# Patient Record
Sex: FEMALE | Race: WHITE | Employment: OTHER | URBAN - METROPOLITAN AREA
[De-identification: names, ages, dates, MRNs, and addresses within clinical notes are randomized per-mention and may not be internally consistent; named-entity substitution may affect disease eponyms.]

---

## 2017-02-23 ENCOUNTER — HOSPITAL ENCOUNTER (OUTPATIENT)
Dept: CT IMAGING | Age: 67
Discharge: HOME | End: 2017-02-23
Attending: UROLOGY
Payer: MEDICARE

## 2017-02-23 DIAGNOSIS — R31.29 MICROSCOPIC HEMATURIA: ICD-10-CM

## 2017-02-23 PROCEDURE — 74011636320 HC RX REV CODE- 636/320: Performed by: UROLOGY

## 2017-02-23 PROCEDURE — 74178 CT ABD&PLV WO CNTR FLWD CNTR: CPT

## 2017-02-23 PROCEDURE — 74011000258 HC RX REV CODE- 258: Performed by: UROLOGY

## 2017-02-23 RX ORDER — SODIUM CHLORIDE 0.9 % (FLUSH) 0.9 %
10 SYRINGE (ML) INJECTION
Status: COMPLETED | OUTPATIENT
Start: 2017-02-23 | End: 2017-02-23

## 2017-02-23 RX ADMIN — Medication 10 ML: at 14:58

## 2017-02-23 RX ADMIN — SODIUM CHLORIDE 100 ML: 900 INJECTION, SOLUTION INTRAVENOUS at 14:58

## 2017-02-23 RX ADMIN — IOVERSOL 100 ML: 741 INJECTION INTRA-ARTERIAL; INTRAVENOUS at 14:58

## 2017-08-23 ENCOUNTER — HOSPITAL ENCOUNTER (OUTPATIENT)
Dept: MAMMOGRAPHY | Age: 67
Discharge: HOME | End: 2017-08-23
Attending: INTERNAL MEDICINE
Payer: MEDICARE

## 2017-08-23 DIAGNOSIS — Z12.31 VISIT FOR SCREENING MAMMOGRAM: ICD-10-CM

## 2017-08-23 PROCEDURE — 77067 SCR MAMMO BI INCL CAD: CPT

## 2017-12-05 ENCOUNTER — HOSPITAL ENCOUNTER (OUTPATIENT)
Dept: GENERAL RADIOLOGY | Age: 67
Discharge: HOME | End: 2017-12-05
Attending: INTERNAL MEDICINE
Payer: MEDICARE

## 2017-12-05 DIAGNOSIS — Z00.00 ROUTINE CHECK-UP: ICD-10-CM

## 2017-12-05 PROCEDURE — 71020 XR CHEST PA LAT: CPT

## 2018-02-15 ENCOUNTER — HOSPITAL ENCOUNTER (EMERGENCY)
Age: 68
Discharge: HOME | End: 2018-02-15
Attending: EMERGENCY MEDICINE
Payer: MEDICARE

## 2018-02-15 ENCOUNTER — APPOINTMENT (OUTPATIENT)
Dept: GENERAL RADIOLOGY | Age: 68
End: 2018-02-15
Attending: EMERGENCY MEDICINE
Payer: MEDICARE

## 2018-02-15 VITALS
BODY MASS INDEX: 29.99 KG/M2 | OXYGEN SATURATION: 98 % | HEART RATE: 74 BPM | WEIGHT: 180 LBS | DIASTOLIC BLOOD PRESSURE: 71 MMHG | RESPIRATION RATE: 18 BRPM | TEMPERATURE: 98 F | HEIGHT: 65 IN | SYSTOLIC BLOOD PRESSURE: 138 MMHG

## 2018-02-15 DIAGNOSIS — S62.617A CLOSED DISPLACED FRACTURE OF PROXIMAL PHALANX OF LEFT LITTLE FINGER, INITIAL ENCOUNTER: Primary | ICD-10-CM

## 2018-02-15 DIAGNOSIS — S66.902A: ICD-10-CM

## 2018-02-15 DIAGNOSIS — T14.8XXA ABRASION: ICD-10-CM

## 2018-02-15 PROCEDURE — 77030008305 HC SPLNT FNGR ALUM DJOR -A

## 2018-02-15 PROCEDURE — 73130 X-RAY EXAM OF HAND: CPT

## 2018-02-15 PROCEDURE — 75810000053 HC SPLINT APPLICATION: Performed by: PHYSICIAN ASSISTANT

## 2018-02-15 PROCEDURE — 99283 EMERGENCY DEPT VISIT LOW MDM: CPT | Performed by: PHYSICIAN ASSISTANT

## 2018-02-15 RX ORDER — TRAMADOL HYDROCHLORIDE 50 MG/1
50 TABLET ORAL
Qty: 15 TAB | Refills: 0 | Status: SHIPPED | OUTPATIENT
Start: 2018-02-15

## 2018-02-15 NOTE — ED PROVIDER NOTES
Patient is a 75 y.o. female presenting with finger pain. The history is provided by the patient. Finger Pain    This is a new problem. The current episode started less than 1 hour ago. The problem occurs constantly. The problem has not changed since onset. Pain location: left 5th finger  The quality of the pain is described as aching. The pain is at a severity of 5/10. The pain is mild. Associated symptoms include limited range of motion and stiffness. The symptoms are aggravated by activity and palpation. She has tried nothing for the symptoms. The treatment provided no relief. There has been a history of trauma. Past Medical History:   Diagnosis Date    Chronic kidney disease     stones    Diabetes ([address]     Hypertension     Kidney stone        Past Surgical History:   Procedure Laterality Date    HX BREAST BIOPSY      Rt Breast    HX BREAST REDUCTION      2008    HX GYN      hysterectomy    HX HYSTERECTOMY      HX OTHER SURGICAL      liithotripsy, parathyroid    HX TONSILLECTOMY           Family History:   Problem Relation Age of Onset    Hypertension Brother     Breast Cancer Neg Hx     Kidney Disease Neg Hx        Social History     Social History    Marital status:      Spouse name: N/A    Number of children: N/A    Years of education: N/A     Occupational History    Not on file. Social History Main Topics    Smoking status: Never Smoker    Smokeless tobacco: Never Used    Alcohol use No    Drug use: Not on file    Sexual activity: Not on file     Other Topics Concern    Not on file     Social History Narrative         ALLERGIES: Cephalexin; Morphine; and Pcn [penicillins]    Review of Systems   Musculoskeletal: Positive for stiffness. All other systems reviewed and are negative.       Vitals:    02/15/18 1415   BP: 142/74   Pulse: 77   Resp: 18   Temp: 98 °F (36.7 °C)   SpO2: 94%   Weight: 81.6 kg (180 lb)   Height: 5' 5\" (1.651 m)            Physical Exam Constitutional: She is oriented to person, place, and time. She appears well-developed and well-nourished. No distress. HENT:   Head: Normocephalic and atraumatic. Eyes: Conjunctivae and EOM are normal. Pupils are equal, round, and reactive to light. Neck: Normal range of motion. Neck supple. Cardiovascular: Normal rate and regular rhythm. Pulmonary/Chest: Effort normal and breath sounds normal. No respiratory distress. She has no wheezes. Abdominal: Soft. Bowel sounds are normal.   Musculoskeletal: She exhibits edema and tenderness. Left 5th finger with swelling and bruising over pip joint area, can flex cannot extend, no abrasion, sensation intact, rt knee with slight abrasion, full rom    Neurological: She is alert and oriented to person, place, and time. Skin: Skin is warm. Nursing note and vitals reviewed.        MDM  Number of Diagnoses or Management Options  Diagnosis management comments: Left 5th finger with chip fx to pip joint, probable extensor tendon injury, placed in extension splint  To follow up with Ladan   Pt up to date on tetanus , to keep knee abrasion clean        Amount and/or Complexity of Data Reviewed  Tests in the radiology section of CPT®: ordered and reviewed  Review and summarize past medical records: yes    Risk of Complications, Morbidity, and/or Mortality  Presenting problems: moderate  Diagnostic procedures: moderate  Management options: low    Patient Progress  Patient progress: improved        ED Course       Procedures

## 2018-02-15 NOTE — ED NOTES
I have reviewed discharge instructions with the patient. The patient verbalized understanding. Patient left ED via Discharge Method: ambulatory to Home with (spouse). Opportunity for questions and clarification provided. Patient given 1 scripts. To continue your aftercare when you leave the hospital, you may receive an automated call from our care team to check in on how you are doing. This is a free service and part of our promise to provide the best care and service to meet your aftercare needs.  If you have questions, or wish to unsubscribe from this service please call [phone]. Thank you for Choosing our [facility].

## 2018-02-15 NOTE — DISCHARGE INSTRUCTIONS
Scrapes (Abrasions): Care Instructions  Your Care Instructions  Scrapes (abrasions) are wounds where your skin has been rubbed or torn off. Most scrapes do not go deep into the skin, but some may remove several layers of skin. Scrapes usually don't bleed much, but they may ooze pinkish fluid. Scrapes on the head or face may appear worse than they are. They may bleed a lot because of the good blood supply to this area. Most scrapes heal well and may not need a bandage. They usually heal within 3 to 7 days. A large, deep scrape may take 1 to 2 weeks or longer to heal. A scab may form on some scrapes. Follow-up care is a key part of your treatment and safety. Be sure to make and go to all appointments, and call your doctor if you are having problems. It's also a good idea to know your test results and keep a list of the medicines you take. How can you care for yourself at home? · If your doctor told you how to care for your wound, follow your doctor's instructions. If you did not get instructions, follow this general advice:  ¨ Wash the scrape with clean water 2 times a day. Don't use hydrogen peroxide or alcohol, which can slow healing. ¨ You may cover the scrape with a thin layer of petroleum jelly, such as Vaseline, and a nonstick bandage. ¨ Apply more petroleum jelly and replace the bandage as needed. · Prop up the injured area on a pillow anytime you sit or lie down during the next 3 days. Try to keep it above the level of your heart. This will help reduce swelling. · Be safe with medicines. Take pain medicines exactly as directed. ¨ If the doctor gave you a prescription medicine for pain, take it as prescribed. ¨ If you are not taking a prescription pain medicine, ask your doctor if you can take an over-the-counter medicine. When should you call for help?   Call your doctor now or seek immediate medical care if:  ? · You have signs of infection, such as:  ¨ Increased pain, swelling, warmth, or redness around the scrape. ¨ Red streaks leading from the scrape. ¨ Pus draining from the scrape. ¨ A fever. ? · The scrape starts to bleed, and blood soaks through the bandage. Oozing small amounts of blood is normal.   ? Watch closely for changes in your health, and be sure to contact your doctor if the scrape is not getting better each day. Where can you learn more? Go to http://doroteo-waldo.info/. Enter A374 in the search box to learn more about \"Scrapes (Abrasions): Care Instructions. \"  Current as of: March 20, 2017  Content Version: 11.4  © 2125-2638 [employer]. Care instructions adapted under license by [employer] (which disclaims liability or warranty for this information). If you have questions about a medical condition or this instruction, always ask your healthcare professional. [place] [address] any warranty or liability for your use of this information. Finger Fracture: Care Instructions  Your Care Instructions    Breaks in the bones of the finger usually heal well in about 3 to 4 weeks. The pain and swelling from a broken finger can last for weeks. But it should steadily improve, starting a few days after you break it. It is very important that you wear and take care of the cast or splint exactly as your doctor tells you to so that your finger heals properly and does not end up crooked. Wearing a splint may interfere with your normal activities. Ask for help with daily tasks if you need it. You heal best when you take good care of yourself. Eat a variety of healthy foods, and don't smoke. Follow-up care is a key part of your treatment and safety. Be sure to make and go to all appointments, and call your doctor if you are having problems. It's also a good idea to know your test results and keep a list of the medicines you take. How can you care for yourself at home?   · If your doctor put a splint on your finger, wear the splint exactly as directed. Do not remove it until your doctor says that you can. · Keep your hand raised above the level of your heart as much as you can. This will help reduce swelling. · Put ice or a cold pack on your finger for 10 to 20 minutes at a time. Try to do this every 1 to 2 hours for the next 3 days (when you are awake) or until the swelling goes down. Put a thin cloth between the ice and your skin. Keep the splint dry. · Be safe with medicines. Take pain medicines exactly as directed. ¨ If the doctor gave you a prescription medicine for pain, take it as prescribed. ¨ If you are not taking a prescription pain medicine, ask your doctor if you can take an over-the-counter medicine. When should you call for help? Call 911 anytime you think you may need emergency care. For example, call if:  ? · Your finger is cool or pale or changes color. ?Call your doctor now or seek immediate medical care if:  ? · Your pain gets much worse. ? · You have tingling, weakness, or numbness in your finger. ? · You have signs of infection, such as:  ¨ Increased pain, swelling, warmth, or redness. ¨ Red streaks leading from the area. ¨ Pus draining from the area. ¨ Swollen lymph nodes in your neck, armpits, or groin. ¨ A fever. ? Watch closely for changes in your health, and be sure to contact your doctor if:  ? · Your finger is not steadily improving. Where can you learn more? Go to http://doroteo-waldo.info/. Enter S842 in the search box to learn more about \"Finger Fracture: Care Instructions. \"  Current as of: March 21, 2017  Content Version: 11.4  © 2491-9523 [employer]. Care instructions adapted under license by [employer] (which disclaims liability or warranty for this information).  If you have questions about a medical condition or this instruction, always ask your healthcare professional. [address]  any warranty or liability for your use of this information.

## 2018-02-24 ENCOUNTER — ANESTHESIA EVENT (OUTPATIENT)
Dept: SURGERY | Age: 68
End: 2018-02-24
Payer: MEDICARE

## 2018-02-26 ENCOUNTER — APPOINTMENT (OUTPATIENT)
Dept: GENERAL RADIOLOGY | Age: 68
End: 2018-02-26
Attending: ORTHOPAEDIC SURGERY
Payer: MEDICARE

## 2018-02-26 ENCOUNTER — ANESTHESIA (OUTPATIENT)
Dept: SURGERY | Age: 68
End: 2018-02-26
Payer: MEDICARE

## 2018-02-26 ENCOUNTER — HOSPITAL ENCOUNTER (OUTPATIENT)
Age: 68
Setting detail: OUTPATIENT SURGERY
Discharge: HOME | End: 2018-02-26
Attending: ORTHOPAEDIC SURGERY | Admitting: ORTHOPAEDIC SURGERY
Payer: MEDICARE

## 2018-02-26 VITALS
RESPIRATION RATE: 15 BRPM | HEART RATE: 61 BPM | BODY MASS INDEX: 30.99 KG/M2 | WEIGHT: 186.2 LBS | OXYGEN SATURATION: 96 % | DIASTOLIC BLOOD PRESSURE: 79 MMHG | TEMPERATURE: 97.8 F | SYSTOLIC BLOOD PRESSURE: 174 MMHG

## 2018-02-26 LAB
GLUCOSE BLD STRIP.AUTO-MCNC: 127 MG/DL (ref 65–100)
POTASSIUM BLD-SCNC: 3.5 MMOL/L (ref 3.5–5.1)

## 2018-02-26 PROCEDURE — 77030018836 HC SOL IRR NACL ICUM -A: Performed by: ORTHOPAEDIC SURGERY

## 2018-02-26 PROCEDURE — 76010000149 HC OR TIME 1 TO 1.5 HR: Performed by: ORTHOPAEDIC SURGERY

## 2018-02-26 PROCEDURE — 76060000033 HC ANESTHESIA 1 TO 1.5 HR: Performed by: ORTHOPAEDIC SURGERY

## 2018-02-26 PROCEDURE — C1789 PROSTHESIS, BREAST, IMP: HCPCS | Performed by: ORTHOPAEDIC SURGERY

## 2018-02-26 PROCEDURE — 77030008304 HC SPLNT FNGR ALUM DERY -A: Performed by: ORTHOPAEDIC SURGERY

## 2018-02-26 PROCEDURE — 74011000250 HC RX REV CODE- 250: Performed by: ORTHOPAEDIC SURGERY

## 2018-02-26 PROCEDURE — 77030008847 HC WRE K SYNT -A: Performed by: ORTHOPAEDIC SURGERY

## 2018-02-26 PROCEDURE — 82962 GLUCOSE BLOOD TEST: CPT

## 2018-02-26 PROCEDURE — 77030002933 HC SUT MCRYL J&J -A: Performed by: ORTHOPAEDIC SURGERY

## 2018-02-26 PROCEDURE — 74011000258 HC RX REV CODE- 258: Performed by: ORTHOPAEDIC SURGERY

## 2018-02-26 PROCEDURE — 77030011884 HC TAPE CST PLSTR BSNM -A: Performed by: ORTHOPAEDIC SURGERY

## 2018-02-26 PROCEDURE — 74011250636 HC RX REV CODE- 250/636: Performed by: ANESTHESIOLOGY

## 2018-02-26 PROCEDURE — 76210000063 HC OR PH I REC FIRST 0.5 HR: Performed by: ORTHOPAEDIC SURGERY

## 2018-02-26 PROCEDURE — 77030020778 HC CAP PROTCT PIN JRGN -A: Performed by: ORTHOPAEDIC SURGERY

## 2018-02-26 PROCEDURE — C1713 ANCHOR/SCREW BN/BN,TIS/BN: HCPCS | Performed by: ORTHOPAEDIC SURGERY

## 2018-02-26 PROCEDURE — 77030002986 HC SUT PROL J&J -A: Performed by: ORTHOPAEDIC SURGERY

## 2018-02-26 PROCEDURE — 76210000020 HC REC RM PH II FIRST 0.5 HR: Performed by: ORTHOPAEDIC SURGERY

## 2018-02-26 PROCEDURE — 84132 ASSAY OF SERUM POTASSIUM: CPT

## 2018-02-26 PROCEDURE — 74011250636 HC RX REV CODE- 250/636: Performed by: ORTHOPAEDIC SURGERY

## 2018-02-26 PROCEDURE — 74011250636 HC RX REV CODE- 250/636

## 2018-02-26 PROCEDURE — 77030033681 HC SPLNT P-CUT SAF BSNM -A: Performed by: ORTHOPAEDIC SURGERY

## 2018-02-26 PROCEDURE — 77030000032 HC CUF TRNQT ZIMM -B: Performed by: ORTHOPAEDIC SURGERY

## 2018-02-26 DEVICE — IMPLANTABLE DEVICE: Type: IMPLANTABLE DEVICE | Site: FINGER | Status: FUNCTIONAL

## 2018-02-26 RX ORDER — FENTANYL CITRATE 50 UG/ML
INJECTION, SOLUTION INTRAMUSCULAR; INTRAVENOUS AS NEEDED
Status: DISCONTINUED | OUTPATIENT
Start: 2018-02-26 | End: 2018-02-26 | Stop reason: HOSPADM

## 2018-02-26 RX ORDER — LIDOCAINE HYDROCHLORIDE 10 MG/ML
INJECTION INFILTRATION; PERINEURAL AS NEEDED
Status: DISCONTINUED | OUTPATIENT
Start: 2018-02-26 | End: 2018-02-26 | Stop reason: HOSPADM

## 2018-02-26 RX ORDER — MIDAZOLAM HYDROCHLORIDE 1 MG/ML
INJECTION, SOLUTION INTRAMUSCULAR; INTRAVENOUS AS NEEDED
Status: DISCONTINUED | OUTPATIENT
Start: 2018-02-26 | End: 2018-02-26 | Stop reason: HOSPADM

## 2018-02-26 RX ORDER — PROPOFOL 10 MG/ML
INJECTION, EMULSION INTRAVENOUS
Status: DISCONTINUED | OUTPATIENT
Start: 2018-02-26 | End: 2018-02-26 | Stop reason: HOSPADM

## 2018-02-26 RX ORDER — OXYCODONE HYDROCHLORIDE 5 MG/1
5 TABLET ORAL
Status: DISCONTINUED | OUTPATIENT
Start: 2018-02-26 | End: 2018-02-26 | Stop reason: HOSPADM

## 2018-02-26 RX ORDER — BUPIVACAINE HYDROCHLORIDE 5 MG/ML
INJECTION, SOLUTION EPIDURAL; INTRACAUDAL AS NEEDED
Status: DISCONTINUED | OUTPATIENT
Start: 2018-02-26 | End: 2018-02-26 | Stop reason: HOSPADM

## 2018-02-26 RX ORDER — HYDROMORPHONE HYDROCHLORIDE 2 MG/ML
0.5 INJECTION, SOLUTION INTRAMUSCULAR; INTRAVENOUS; SUBCUTANEOUS
Status: DISCONTINUED | OUTPATIENT
Start: 2018-02-26 | End: 2018-02-26 | Stop reason: HOSPADM

## 2018-02-26 RX ORDER — SODIUM CHLORIDE, SODIUM LACTATE, POTASSIUM CHLORIDE, CALCIUM CHLORIDE 600; 310; 30; 20 MG/100ML; MG/100ML; MG/100ML; MG/100ML
75 INJECTION, SOLUTION INTRAVENOUS CONTINUOUS
Status: DISCONTINUED | OUTPATIENT
Start: 2018-02-26 | End: 2018-02-26 | Stop reason: HOSPADM

## 2018-02-26 RX ORDER — MIDAZOLAM HYDROCHLORIDE 1 MG/ML
2 INJECTION, SOLUTION INTRAMUSCULAR; INTRAVENOUS
Status: DISCONTINUED | OUTPATIENT
Start: 2018-02-26 | End: 2018-02-26 | Stop reason: HOSPADM

## 2018-02-26 RX ORDER — SODIUM CHLORIDE 0.9 % (FLUSH) 0.9 %
5-10 SYRINGE (ML) INJECTION AS NEEDED
Status: DISCONTINUED | OUTPATIENT
Start: 2018-02-26 | End: 2018-02-26 | Stop reason: HOSPADM

## 2018-02-26 RX ORDER — OXYCODONE AND ACETAMINOPHEN 10; 325 MG/1; MG/1
1 TABLET ORAL AS NEEDED
Status: DISCONTINUED | OUTPATIENT
Start: 2018-02-26 | End: 2018-02-26 | Stop reason: HOSPADM

## 2018-02-26 RX ADMIN — PROPOFOL 160 MCG/KG/MIN: 10 INJECTION, EMULSION INTRAVENOUS at 09:30

## 2018-02-26 RX ADMIN — GENTAMICIN SULFATE 340 MG: 40 INJECTION, SOLUTION INTRAMUSCULAR; INTRAVENOUS at 09:29

## 2018-02-26 RX ADMIN — MIDAZOLAM HYDROCHLORIDE 1 MG: 1 INJECTION, SOLUTION INTRAMUSCULAR; INTRAVENOUS at 09:28

## 2018-02-26 RX ADMIN — CLINDAMYCIN PHOSPHATE 900 MG: 150 INJECTION, SOLUTION INTRAVENOUS at 09:19

## 2018-02-26 RX ADMIN — SODIUM CHLORIDE, SODIUM LACTATE, POTASSIUM CHLORIDE, AND CALCIUM CHLORIDE 75 ML/HR: 600; 310; 30; 20 INJECTION, SOLUTION INTRAVENOUS at 09:00

## 2018-02-26 RX ADMIN — FENTANYL CITRATE 25 MCG: 50 INJECTION, SOLUTION INTRAMUSCULAR; INTRAVENOUS at 09:40

## 2018-02-26 RX ADMIN — MIDAZOLAM HYDROCHLORIDE 1 MG: 1 INJECTION, SOLUTION INTRAMUSCULAR; INTRAVENOUS at 09:41

## 2018-02-26 NOTE — ANESTHESIA PREPROCEDURE EVALUATION
Anesthetic History   No history of anesthetic complications            Review of Systems / Medical History  Patient summary reviewed and pertinent labs reviewed    Pulmonary  Within defined limits                 Neuro/Psych   Within defined limits           Cardiovascular    Hypertension: well controlled              Exercise tolerance: >4 METS     GI/Hepatic/Renal                Endo/Other    Diabetes: type 2    Obesity     Other Findings              Physical Exam    Airway  Mallampati: II  TM Distance: > 6 cm  Neck ROM: normal range of motion   Mouth opening: Normal     Cardiovascular    Rhythm: regular           Dental    Dentition: Caps/crowns     Pulmonary                 Abdominal  GI exam deferred       Other Findings            Anesthetic Plan    ASA: 3  Anesthesia type: general          Induction: Intravenous  Anesthetic plan and risks discussed with: Patient

## 2018-02-26 NOTE — IP AVS SNAPSHOT
[address] [address] [address] 
[phone] Patient: Franco Gilliland MRN: [id number] NMO:[birth year] About your hospitalization You were admitted on:  February 26, 2018 You last received care in the:  [facility] OP PACU You were discharged on:  February 26, 2018 Why you were hospitalized Your primary diagnosis was:  Not on File Follow-up Information Follow up With Details Comments Contact Info Cuong Zaidi MD   [address] [facility] [address] [facility] [address] 
[phone] Discharge Orders None A check laly indicates which time of day the medication should be taken. My Medications CONTINUE taking these medications Instructions Each Dose to Equal  
 Morning Noon Evening Bedtime AMBIEN 10 mg tablet Generic drug:  zolpidem Your last dose was: Your next dose is: Take  by mouth nightly as needed for Sleep. aspirin 81 mg tablet Your last dose was: Your next dose is: Take 81 mg by mouth every other day. 81 mg  
    
   
   
   
  
 citalopram 10 mg tablet Commonly known as:  Lexx Monterroso Your last dose was: Your next dose is: Take 20 mg by mouth daily. Pt takes 1/2  Of 40 mg tab  
 20 mg  
    
   
   
   
  
 estradiol 0.05 mg/24 hr  
Commonly known as:  Hermes Beams Your last dose was: Your next dose is:    
   
   
 1 Patch by TransDERmal route two (2) times a week. Takes on tues, and sat 1 Patch JANUVIA 100 mg tablet Generic drug:  SITagliptin Your last dose was: Your next dose is: Take 100 mg by mouth daily. 100 mg  
    
   
   
   
  
 lisinopril-hydroCHLOROthiazide 20-25 mg per tablet Commonly known as:  Aurora Edwards Your last dose was: Your next dose is: Take 1 Tab by mouth two (2) times a day. 1 Tab  
    
   
   
   
  
 nicotinic acid 100 mg tablet Commonly known as:  NIACIN Your last dose was: Your next dose is: Take 100 mg by mouth every evening. 100 mg  
    
   
   
   
  
 TOPROL  mg tablet Generic drug:  metoprolol succinate Your last dose was: Your next dose is: Take 50 mg by mouth nightly. 50 mg  
    
   
   
   
  
 traMADol 50 mg tablet Commonly known as:  ULTRAM  
   
Your last dose was: Your next dose is: Take 1 Tab by mouth every eight (8) hours as needed for Pain. Max Daily Amount: 150 mg.  
 50 mg  
    
   
   
   
  
 VITAMIN D3 1,000 unit Cap Generic drug:  cholecalciferol Your last dose was: Your next dose is: Take 2,000 Units by mouth daily. 2000 Units Discharge Instructions Keep splint clean, dry and intact until seen in office. Move fingers, not in splint, elevate, and ice to prevent swelling. No lifting. TYPICAL SIDE EFFECTS OF PAIN MEDICATION: 
*    Constipation: Drink lots of fluids, try prune juice. OTC stool softener if needed. *    Nausea: Take pain medication with food. ACTIVITY · As tolerated and as directed by your doctor. · Bathe or shower as directed by your doctor. DIET 
· Day of surgery: Clear liquids until no nausea or vomiting; small portion, light diet [place] foods (ex: baked chicken, plain rice, grits, scrambled eggs, toast). Nothing greasy, fried or spicy today. · Advance to regular diet on second day, unless your doctor orders otherwise. · If nausea and vomiting continues, call your doctor. PAIN 
· Take pain medication as directed by your doctor. · DO NOT take aspirin or blood thinners unless directed by your doctor. CALL YOUR DOCTOR IF   
s Call your doctor if pain is NOT relieved by medication. s Excessive bleeding that does not stop after holding pressure over the area · Temperature of 101 degrees F or above · Excessive redness, swelling or bruising, and/ or green or yellow, smelly discharge from incision AFTER ANESTHESIA · For the first 24 hours: DO NOT Drive, Drink alcoholic beverages, or Make important decisions. · Be aware of dizziness following anesthesia and while taking pain medication. DISCHARGE SUMMARY from Nurse PATIENT INSTRUCTIONS: 
 
After general anesthesia or intravenous sedation, for 24 hours or while taking prescription Narcotics: · Limit your activities · Do not drive and operate hazardous machinery · Do not make important personal or business decisions · Do  not drink alcoholic beverages · If you have not urinated within 8 hours after discharge, please contact your surgeon on call. *  Please give a list of your current medications to your Primary Care Provider. *  Please update this list whenever your medications are discontinued, doses are 
    changed, or new medications (including over-the-counter products) are added. *  Please carry medication information at all times in case of emergency situations. Preventing Infection at Home We care about preventing infection and avoiding the spread of germs  not only when you are in the hospital but also when you return home. When you return home from the hospital, its important to take the following steps to help prevent infection and avoid spreading germs that could infect you and others. Ask everyone in your home to follow these guidelines, too. Clean Your Hands · Clean your hands whenever your hands are visibly dirty, before you eat, before or after touching your mouth, nose or eyes, and before preparing food. Clean them after contact with body fluids, using the restroom, touching animals or changing diapers. · When washing hands, wet them with warm water and work up a lather.  Rub hands for at least 15 seconds, then rinse them and pat them dry with a clean towel or paper towel. · When using hand sanitizers, it should take about 15 seconds to rub your hands dry. If not, you probably didnt apply enough . Cover Your Sneeze or Cough Germs are released into the air whenever you sneeze or cough. To prevent the spread of infection: · Turn away from other people before coughing or sneezing. · Cover your mouth or nose with a tissue when you cough or sneeze. Put the tissue in the trash. · If you dont have a tissue, cough or sneeze into your upper sleeve, not your hands. · Always clean your hands after coughing or sneezing. Care for Wounds Your skin is your bodys first line of defense against germs, but an open wound leaves an easy way for germs to enter your body. To prevent infection: · Clean your hands before and after changing wound dressings, and wear gloves to change dressings if recommended by your doctor. · Take special care with IV lines or other devices inserted into the body. If you must touch them, clean your hands first. 
· Follow any specific instructions from your doctor to care for your wounds. Contact your doctor if you experience any signs of infection, such as fever or increased redness at the surgical or wound site. Keep a [address] · Clean or wipe commonly touched hard surfaces like door handles, sinks, tabletops, phones and TV remotes. · Use products labeled disinfectant to kill harmful bacteria and viruses. · Use a clean cloth or paper towel to clean and dry surfaces. Wiping surfaces with a dirty dishcloth, sponge or towel will only spread germs. · Never share toothbrushes, salgado, drinking glasses, utensils, razor blades, face cloths or bath towels to avoid spreading germs. · Be sure that the linens that you sleep on are clean. · Keep pets away from wounds and wash your hands after touching pets, their toys or bedding. We care about you and your health. Remember, preventing infections is a team effort between you, your family, friends and health care providers. These are general instructions for a healthy lifestyle: No smoking/ No tobacco products/ Avoid exposure to second hand smoke Surgeon General's Warning:  Quitting smoking now greatly reduces serious risk to your health. Obesity, smoking, and sedentary lifestyle greatly increases your risk for illness A healthy diet, regular physical exercise & weight monitoring are important for maintaining a healthy lifestyle You may be retaining fluid if you have a history of heart failure or if you experience any of the following symptoms:  Weight gain of 3 pounds or more overnight or 5 pounds in a week, increased swelling in our hands or feet or shortness of breath while lying flat in bed. Please call your doctor as soon as you notice any of these symptoms; do not wait until your next office visit. Recognize signs and symptoms of STROKE: 
 
F-face looks uneven A-arms unable to move or move unevenly S-speech slurred or non-existent T-time-call 911 as soon as signs and symptoms begin-DO NOT go Back to bed or wait to see if you get better-TIME IS BRAIN. Introducing [facility]! Dear Elysia Barraza: Thank you for requesting a [employer] account. Our records indicate that you already have an active [employer] account. You can access your account anytime at [email or web address] Did you know that you can access your hospital and ER discharge instructions at any time in [employer]? You can also review all of your test results from your hospital stay or ER visit. Additional Information If you have questions, please visit the Frequently Asked Questions section of the [employer] website at [email or web address] Remember, [employer] is NOT to be used for urgent needs. For medical emergencies, dial 911. Now available from your iPhone and Android! Unresulted Labs-Please follow up with your PCP about these lab tests Order Current Status [facility] TECHNOLOGIST SERVICE In process Providers Seen During Your Hospitalization Provider Specialty Primary office phone Nena Worley MD Orthopedic Surgery [phone] Your Primary Care Physician (PCP) Primary Care Physician Office Phone Office Fax Karen Banda [phone] You are allergic to the following Allergen Reactions Cephalexin Hives Cephalosporins Hives Morphine Nausea and Vomiting Pcn (Penicillins) Hives Recent Documentation Weight BMI OB Status Smoking Status 84.5 kg 30.99 kg/m2 Hysterectomy Never Smoker Emergency Contacts Name Discharge Info Relation Home Work Mobile Timbo Alvarado DISCHARGE CAREGIVER [3] Spouse [3] [phone] Patient Belongings The following personal items are in your possession at time of discharge: 
     Visual Aid: Glasses, Sent home          Jewelry: None  Clothing: Undergarments, Footwear, Shirt, Pants Please provide this summary of care documentation to your next provider. Signatures-by signing, you are acknowledging that this After Visit Summary has been reviewed with you and you have received a copy. Patient Signature:  ____________________________________________________________ Date:  ____________________________________________________________  
  
Margot Recinos Provider Signature:  ____________________________________________________________ Date:  ____________________________________________________________

## 2018-02-26 NOTE — ANESTHESIA POSTPROCEDURE EVALUATION
Post-Anesthesia Evaluation and Assessment    Patient: Jose Guadalupe Arrington MRN: [id number]  SSN: [id number]    YOB: 1950  Age: 75 y.o. Sex: female       Cardiovascular Function/Vital Signs  Visit Vitals    /79 (BP 1 Location: Right arm, BP Patient Position: Supine)    Pulse 61    Temp 36.6 °C (97.8 °F)    Resp 15    Wt 84.5 kg (186 lb 3.2 oz)    SpO2 96%    BMI 30.99 kg/m2       Patient is status post general anesthesia for Procedure(s):  LEFT SMALL PROXIMAL INTERPHALANGEAL PHALYNX OPEN REDUCTION INTERNAL FIXATION. Nausea/Vomiting: None    Postoperative hydration reviewed and adequate. Pain:  Pain Scale 1: Numeric (0 - 10) (02/26/18 1112)  Pain Intensity 1: 0 (02/26/18 1112)   Managed    Neurological Status:   Neuro (WDL): Exceptions to WDL (02/26/18 1112)  Neuro  Neurologic State: Drowsy (02/26/18 1033)  LUE Motor Response: Numbness (02/26/18 1112)   At baseline    Mental Status and Level of Consciousness: Arousable    Pulmonary Status:   O2 Device: Room air (02/26/18 1112)   Adequate oxygenation and airway patent    Complications related to anesthesia: None    Post-anesthesia assessment completed.  No concerns    Signed By: Yaw Goldberg MD     February 26, 2018

## 2018-02-26 NOTE — DISCHARGE INSTRUCTIONS
Keep splint clean, dry and intact until seen in office. Move fingers, not in splint, elevate, and ice to prevent swelling. No lifting. TYPICAL SIDE EFFECTS OF PAIN MEDICATION:  *    Constipation: Drink lots of fluids, try prune juice. OTC stool softener if needed. *    Nausea: Take pain medication with food. ACTIVITY  · As tolerated and as directed by your doctor. · Bathe or shower as directed by your doctor. DIET  · Day of surgery: Clear liquids until no nausea or vomiting; small portion, light diet [place] foods (ex: baked chicken, plain rice, grits, scrambled eggs, toast). Nothing greasy, fried or spicy today. · Advance to regular diet on second day, unless your doctor orders otherwise. · If nausea and vomiting continues, call your doctor. PAIN  · Take pain medication as directed by your doctor. · DO NOT take aspirin or blood thinners unless directed by your doctor. CALL YOUR DOCTOR IF    s Call your doctor if pain is NOT relieved by medication.   s Excessive bleeding that does not stop after holding pressure over the area  · Temperature of 101 degrees F or above  · Excessive redness, swelling or bruising, and/ or green or yellow, smelly discharge from incision    AFTER ANESTHESIA   · For the first 24 hours: DO NOT Drive, Drink alcoholic beverages, or Make important decisions. · Be aware of dizziness following anesthesia and while taking pain medication. DISCHARGE SUMMARY from Nurse    PATIENT INSTRUCTIONS:    After general anesthesia or intravenous sedation, for 24 hours or while taking prescription Narcotics:  · Limit your activities  · Do not drive and operate hazardous machinery  · Do not make important personal or business decisions  · Do  not drink alcoholic beverages  · If you have not urinated within 8 hours after discharge, please contact your surgeon on call. *  Please give a list of your current medications to your Primary Care Provider.     *  Please update this list whenever your medications are discontinued, doses are      changed, or new medications (including over-the-counter products) are added. *  Please carry medication information at all times in case of emergency situations. Preventing Infection at Home  We care about preventing infection and avoiding the spread of germs - not only when you are in the hospital but also when you return home. When you return home from the hospital, its important to take the following steps to help prevent infection and avoid spreading germs that could infect you and others. Ask everyone in your home to follow these guidelines, too. Clean Your Hands  · Clean your hands whenever your hands are visibly dirty, before you eat, before or after touching your mouth, nose or eyes, and before preparing food. Clean them after contact with body fluids, using the restroom, touching animals or changing diapers. · When washing hands, wet them with warm water and work up a lather. Rub hands for at least 15 seconds, then rinse them and pat them dry with a clean towel or paper towel. · When using hand sanitizers, it should take about 15 seconds to rub your hands dry. If not, you probably didnt apply enough . Cover Your Sneeze or Cough  Germs are released into the air whenever you sneeze or cough. To prevent the spread of infection:  · Turn away from other people before coughing or sneezing. · Cover your mouth or nose with a tissue when you cough or sneeze. Put the tissue in the trash. · If you dont have a tissue, cough or sneeze into your upper sleeve, not your hands. · Always clean your hands after coughing or sneezing. Care for Wounds  Your skin is your bodys first line of defense against germs, but an open wound leaves an easy way for germs to enter your body.  To prevent infection:  · Clean your hands before and after changing wound dressings, and wear gloves to change dressings if recommended by your doctor. · Take special care with IV lines or other devices inserted into the body. If you must touch them, clean your hands first.  · Follow any specific instructions from your doctor to care for your wounds. Contact your doctor if you experience any signs of infection, such as fever or increased redness at the surgical or wound site. Keep a Clean Home  · Clean or wipe commonly touched hard surfaces like door handles, sinks, tabletops, phones and TV remotes. · Use products labeled disinfectant to kill harmful bacteria and viruses. · Use a clean cloth or paper towel to clean and dry surfaces. Wiping surfaces with a dirty dishcloth, sponge or towel will only spread germs. · Never share toothbrushes, salgado, drinking glasses, utensils, razor blades, face cloths or bath towels to avoid spreading germs. · Be sure that the linens that you sleep on are clean. · Keep pets away from wounds and wash your hands after touching pets, their toys or bedding. We care about you and your health. Remember, preventing infections is a team effort between you, your family, friends and health care providers. These are general instructions for a healthy lifestyle:    No smoking/ No tobacco products/ Avoid exposure to second hand smoke    Surgeon General's Warning:  Quitting smoking now greatly reduces serious risk to your health. Obesity, smoking, and sedentary lifestyle greatly increases your risk for illness    A healthy diet, regular physical exercise & weight monitoring are important for maintaining a healthy lifestyle    You may be retaining fluid if you have a history of heart failure or if you experience any of the following symptoms:  Weight gain of 3 pounds or more overnight or 5 pounds in a week, increased swelling in our hands or feet or shortness of breath while lying flat in bed. Please call your doctor as soon as you notice any of these symptoms; do not wait until your next office visit.     Recognize signs and symptoms of STROKE:    F-face looks uneven    A-arms unable to move or move unevenly    S-speech slurred or non-existent    T-time-call 911 as soon as signs and symptoms begin-DO NOT go       Back to bed or wait to see if you get better-TIME IS BRAIN.

## 2018-02-26 NOTE — BRIEF OP NOTE
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]  Postoperative Diagnosis: LEFT BOUTONNIERE FINGER    Procedure(s):  LEFT SMALL PROXIMAL INTERPHALANGEAL PHALYNX OPEN REDUCTION INTERNAL FIXATION  LEFT SMALL CENTRAL SLIP REPAIR  Surgeon(s) and Role:     * Jaki Vieira MD - Primary         Assistant Staff: None      Surgical Staff:  Circ-1: Dionte Cardenas RN  Radiology Technician: Hugo Lima, RT, R, CT  Scrub Tech-1: Bebeto Robert  Event Time In   Incision Start 0935   Incision Close 1026     Anesthesia: MAC   Estimated Blood Loss: MINIMAL  Specimens: * No specimens in log *   Findings: SEE DICTATION   Complications: NONE  Implants:   Implant Name Type Inv.  Item Serial No.  Lot No. LRB No. Used Action   WIRE K STYLE-1 [id number] --  - [id number]  Ollie Chessman STYLE-1 [id number] --   Ramses Mazariegos [id number] Left 1 Implanted   WIRE K TRCR PT 0.6X70MM SS --  - [id number]   Ollie Chessman TRCR PT 0.6X70MM SS --    SYNTHES [place] [id number] Left 2 Implanted

## 2018-02-26 NOTE — PERIOP NOTES
Discharge instructions given to spouse. Operative finger is pink and has brisk capillary refillVerbalized understanding and opportunity for questions was given. Dr Leilani Lopez okay to discharge at this time. Pt and belongings taken via wheelchair to car.

## 2018-02-28 NOTE — OP NOTES
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 Procedures: Procedure(s):  LEFT SMALL PROXIMAL INTERPHALANGEAL PHALYNX OPEN REDUCTION INTERNAL FIXATION     left small finger PIP joint ORIF of the proximal aspect of the middle phalanx  Left small finger central slip extensor tendon repair Without graft    EBL/IV FLUIDS: Per Anesthesia. COMPLICATIONS: None. DISPOSITION: Stable to recovery room. INDICATIONS FOR PROCEDURE: The patient is a pleasant 54-year-old female with history of left small finger PIP joint fracture as well as probable central slip disruption that has failed nonoperative measures. After both operative and nonoperative treatment options were discussed, the decision was made to go ahead with a left small finger PIP ORIF and probable central slip extensor tendon repair. Risks and benefits of the procedure were discussed including but not limited to bleeding, infection, injury to adjacent structures , consisting of tendon, artery or nerve, need for additional procedures, wound dehiscence, scar formation, incomplete resolution of symptoms, recurrence of symptoms, transient neurapraxia, decreased range of motion, hypersensitivity, recurrence of deformity, pin tract infection, malunion, nonunion, failure of hardware, need for removal of hardware, irritation of tendon, artery, decreased range of motion, stiffness, pain, as well as anesthetic risk. Informed consent was obtained. PROCEDURE IN DETAIL: The patient was seen and marked in the preoperative suite. The patient was taken back to the OR, placed on the table in supine position with left upper extremities on hand tables.   Left upper extremities were prepped and draped in standard sterile fashion. A formal timeout was performed confirming patient identification, preoperative antibiotics, and planned operative procedure. We infiltrated both planned incision sites performing a digital block with lidocaine and Marcaine, exsanguinated the left upper extremity and the tourniquet was placed up to 250 mmHg  We performed a curvilinear dorsal incision overlying the PIP joint of the left small finger. It was a thin bony fragment that was part intra-articular surface of the PIP joint but also disrupted her central slip tendon insertion. We turned our attention to the first portion of the procedure which was the ORIF of the left small PIP articular fracture. Under direct visualization placed one pin into the fragment. The fragment was too thin to allow for screw fixation. We able to clamp the fragment back into its insertion site and placed 2 pins confirming under fluoroscopy anatomic reduction of the fracture and placement of the pins. Under fluoroscopy it appeared to be stable. We locked to the PIP joint and pinning it to prevent any disruption of the fracture reduction. Next under separate procedure performed a central slip tendon repair. We repaired the central slip utilizing 4-0 suture. We irrigated copiously with normal saline. The incision was closed with running subcuticular Monocryl and Dermabond glue. A total of 3 pins were placed cut appropriately prior to placing a sterile dressing. We glue the incision and Xeroform around the pin. The tourniquet was let down, the fingers had brisk capillary refill and a soft sterile dressing was placed. We utilize an aluminum splint to control the PIP joint. We then placed her in a ulnar gutter splint as well. POSTOPERATIVE CARE: We will get her scheduled for her therapist to get a removable hand-based splint to protect the pins. I will likely unlock the PIP joint and to 3 weeks.   The other pins likely come out in 4-6 weeks depending on healing.     Closure: Primary    Complications: None     Signed By: Tj Villareal MD

## 2018-03-06 ENCOUNTER — HOSPITAL ENCOUNTER (OUTPATIENT)
Dept: PHYSICAL THERAPY | Age: 68
Discharge: HOME | End: 2018-03-06
Payer: MEDICARE

## 2018-03-06 PROCEDURE — G8985 CARRY GOAL STATUS: HCPCS

## 2018-03-06 PROCEDURE — G8984 CARRY CURRENT STATUS: HCPCS

## 2018-03-06 PROCEDURE — 97165 OT EVAL LOW COMPLEX 30 MIN: CPT

## 2018-03-06 NOTE — PROGRESS NOTES
Ambulatory/Rehab Services H2 Model Falls Risk Assessment    Risk Factor Pts. ·   Confusion/Disorientation/Impulsivity  []    4 ·   Symptomatic Depression  []   2 ·   Altered Elimination  []   1 ·   Dizziness/Vertigo  []   1 ·   Gender (Male)  []   1 ·   Any administered antiepileptics (anticonvulsants):  []   2 ·   Any administered benzodiazepines:  []   1 ·   Visual Impairment (specify):  []   1 ·   Portable Oxygen Use  []   1 ·   Orthostatic ? BP  []   1 ·   History of Recent Falls (within 3 mos.)  [x]   5     Ability to Rise from Chair (choose one) Pts. ·   Ability to rise in a single movement  [x]   0 ·   Pushes up, successful in one attempt  []   1 ·   Multiple attempts, but successful  []   3 ·   Unable to rise without assistance  []   4   Total: (5 or greater = High Risk) 5     Falls Prevention Plan:   []                Physical Limitations to Exercise (specify):   []                Mobility Assistance Device (type):   []                Exercise/Equipment Adaptation (specify):    ©2010 [facility] of [address] Patent #[id number].  Federal Law prohibits the replication, distribution or use without written permission from [facility] [employer]

## 2018-03-06 NOTE — THERAPY EVALUATION
Ramesh Mishramelindajames  : 1950  Primary: [facility] [place] [employer]  Secondary:  [facility] [place] [address] [address], [facility] [address]  [phone]   [phone]          OUTPATIENT OCCUPATIONAL THERAPY: Initial Assessment 3/6/2018    ICD-10: Treatment Diagnosis: Stiffness of left hand, not elsewhere classified (M25.642)Pain in left hand (N19.571)  Precautions/Allergies:   Cephalexin; Cephalosporins; Morphine; and Pcn [penicillins]   Fall Risk Score: 5 (? 5 = High Risk)  MD Orders: Evaluate and treat: Hand based splint to include the ring and small finger in safe position  MEDICAL/REFERRING DIAGNOSIS:   Boutonniere deformity of left finger(s) [M20.022]   DATE OF ONSET: 2018  DATE OF SURGERY: 2018   REFERRING PHYSICIAN: Jessica Bell MD  RETURN PHYSICIAN APPOINTMENT: 3/12/2018     INITIAL ASSESSMENT:  Ms. Marta Jones presents with decreased functional use, strength and range of motion of her left Little finger PIP joint proximal aspect. and upper extremity that is affecting her independence with activities of daily living and ability to perform job tasks. I feel that Ms. Alvarado will benefit from skilled occupational therapy to maximize the functional use of her Little finger PIP joint proximal aspect. and upper extremity in daily activities . PLAN OF CARE:   PROBLEM LIST:  1. Pain in left little finger. 2. Decreased motion in left little finger. 3. Decreased strength in left hand. INTERVENTIONS PLANNED:  1. Modalities that may include fluidotherapy, paraffin, ultrasound, and light therapy. 2. Therapeutic exercise including a home exercise program.  3. Manual therapy. 4. Therapeutic activities. TREATMENT PLAN:  Effective Dates: 3/6/2018 TO 2018. Frequency/Duration: For splint only today, will begin therapy after pins are removed.   GOALS: (Goals have been discussed and agreed upon with patient.)  Short-Term Functional Goals: Time Frame: 6 weeks  1. Decrease pain to 2 to allow patient to perform self care tasks. 2. Increase motion in left little finger by 20 degrees to improve functional use of upper extremity in ADL activities. 3. Increase strength in left hand by 10 pounds to allow patient to  and lift objects during self care activities. Discharge Goals: Time Frame: 12 weeks  1. Decrease pain to 1 to allow patient to perform all household  tasks. 2. Increase motion in left little finger by 40 degreees to allow patient to perform all ADL activities. 3. Increase strength in left hand by 15 pounds to allow patient to , lift, hold, and carry heavy objects. Rehabilitation Potential For Stated Goals: Good  Regarding [facility] therapy, I certify that the treatment plan above will be carried out by a therapist or under their direction. Thank you for this referral,  Pineda Madden OT       Referring Physician Signature: Sussy Quintanilla MD _________________________  Date _________            The information in this section was collected on 3/6/2018 (except where otherwise noted). OCCUPATIONAL PROFILE & HISTORY:   History of Present Injury/Illness (Reason for Referral):  Patient injured her left little finger when walking her dog. The leash wrapped around her hand when the dog ran. Past Medical History/Comorbidities:   Ms. Jose Juan Espinosa  has a past medical history of Diabetes ([facility] [address] Finger pain, left; Hypertension; and Kidney stone (last one 2012). She also has no past medical history of Adverse effect of anesthesia; Difficult intubation; Malignant hyperthermia due to anesthesia; Nausea & vomiting; or Pseudocholinesterase deficiency. Ms. Jose Juan Espinosa  has a past surgical history that includes hx breast biopsy; hx breast reduction; hx tonsillectomy; hx hysterectomy; hx lithotripsy (2012); and hx other surgical (1997).   Social History/Living Environment:   Home Environment: Private residence  Prior Level of Function/Work/Activity:  independent  Dominant Side:         LEFT  Current Medications:    Current Outpatient Prescriptions:     lisinopril-hydroCHLOROthiazide (PRINZIDE, ZESTORETIC) 20-25 mg per tablet, Take 1 Tab by mouth two (2) times a day., Disp: , Rfl:     SITagliptin (JANUVIA) 100 mg tablet, Take 100 mg by mouth daily. , Disp: , Rfl:     traMADol (ULTRAM) 50 mg tablet, Take 1 Tab by mouth every eight (8) hours as needed for Pain. Max Daily Amount: 150 mg., Disp: 15 Tab, Rfl: 0    estradiol (VIVELLE) 0.05 mg/24 hr, 1 Patch by TransDERmal route two (2) times a week. Takes on tues, and sat, Disp: , Rfl:     citalopram (CELEXA) 10 mg tablet, Take 20 mg by mouth daily. Pt takes 1/2  Of 40 mg tab, Disp: , Rfl:     nicotinic acid (NIACIN) 100 mg tablet, Take 100 mg by mouth every evening., Disp: , Rfl:     zolpidem (AMBIEN) 10 mg tablet, Take  by mouth nightly as needed for Sleep., Disp: , Rfl:     cholecalciferol (VITAMIN D3) 1,000 unit cap, Take 2,000 Units by mouth daily. , Disp: , Rfl:     metoprolol-XL (TOPROL XL) 100 mg XL tablet, Take 50 mg by mouth nightly., Disp: , Rfl:     aspirin 81 mg tablet, Take 81 mg by mouth every other day., Disp: , Rfl:    Date Last Reviewed:  3/6/2018    Number of medical conditions (excluding presenting problem) that affect the Plan of Care: Brief history (0):  LOW COMPLEXITY   ASSESSMENT OF OCCUPATIONAL PERFORMANCE:   RANGE OF MOTION:     · AROM: Measurement not taken due to recent surgery. STRENGTH:  Not tested. SENSATION:  intact           Physical Skills Involved:  1. Range of Motion  2. Strength  3. Pain (acute) Cognitive Skills Affected (resulting in the inability to perform in a timely and safe manner): 1. none Psychosocial Skills Affected:  1. none   Number of elements that affect the Plan of Care: 1-3:  LOW COMPLEXITY   CLINICAL DECISION MAKING:   Outcome Measure:    Tool Used: Disabilities of the Arm, Shoulder and Hand (DASH) Questionnaire - Quick Version  Score:  Initial: 42/55  Most Recent: X/55 (Date: -- )   Interpretation of Score: The DASH is designed to measure the activities of daily living in person's with upper extremity dysfunction or pain. Each section is scored on a 1-5 scale, 5 representing the greatest disability. The scores of each section are added together for a total score of 55. Score 11 12-19 20-28 29-37 38-45 46-54 55   Modifier CH CI CJ CK CL CM CN     ? Carrying, Moving, and Handling Objects:     - CURRENT STATUS: CL - 60%-79% impaired, limited or restricted    - GOAL STATUS: CK - 40%-59% impaired, limited or restricted    - D/C STATUS:  ---------------To be determined---------------    Medical Necessity:   · Patient is expected to demonstrate progress in strength and range of motion to decrease assistance required with ADL,and household activities. .  Reason for Services/Other Comments:  · Patient has limited motion,strength and function in her left hand. .  Clinical Decision-Making Assessment:     Clinical Decision-Making: LOW COMPLEXITY   TREATMENT:   (In addition to Assessment/Re-Assessment sessions the following treatments were rendered)  Pre-treatment Symptoms/Complaints:  Pain and stiffness in left little finger. Pain: Initial:   Pain Intensity 1: 2  Pain Location 1: Hand  Pain Orientation 1: Left  Post Session:  2     Patient was provided with a handbased splint with little and ring fingers in a safe postion      Treatment/Session Assessment:    · Response to Treatment:  Patients tolerated treatment well with no complications. Upon completion of treatment, skin condition was normal..  · Compliance with Program/Exercises: Will assess as treatment progresses. · Recommendations/Intent for next treatment session: \"Will adjust splint as needed as pins are removed. Will begin therapy per MD.\".   Total Treatment Duration:  OT Patient Time In/Time Out  Time In: 1245  Time Out: 0130    Ma Brigitte Devyn Burks

## 2018-03-27 ENCOUNTER — APPOINTMENT (OUTPATIENT)
Dept: PHYSICAL THERAPY | Age: 68
End: 2018-03-27
Payer: MEDICARE

## 2018-04-30 ENCOUNTER — HOSPITAL ENCOUNTER (OUTPATIENT)
Dept: PHYSICAL THERAPY | Age: 68
Discharge: HOME | End: 2018-04-30
Payer: MEDICARE

## 2018-04-30 ENCOUNTER — HOSPITAL ENCOUNTER (OUTPATIENT)
Dept: PHYSICAL THERAPY | Age: 68
End: 2018-04-30
Payer: MEDICARE

## 2018-04-30 PROCEDURE — 97018 PARAFFIN BATH THERAPY: CPT

## 2018-04-30 PROCEDURE — 97140 MANUAL THERAPY 1/> REGIONS: CPT

## 2018-04-30 PROCEDURE — 97110 THERAPEUTIC EXERCISES: CPT

## 2018-04-30 NOTE — PROGRESS NOTES
Kaylin Corrales  : 1950  Primary: [place] [employer]  Secondary:  [facility] [place] [id number] [facility], [address] [place] [address],[address], [facility].  [phone]   [phone]          OUTPATIENT OCCUPATIONAL THERAPY: Daily Note 2018    ICD-10: Treatment Diagnosis: Stiffness of left hand, not elsewhere classified (M25.642)Pain in left hand (U21.790)  Precautions/Allergies:   Cephalexin; Cephalosporins; Morphine; and Pcn [penicillins]   Fall Risk Score: 5 (? 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 Torie Lutz presents with decreased functional use, strength and range of motion of her left Little finger PIP joint proximal aspect. and upper extremity that is affecting her independence with activities of daily living and ability to perform job tasks. I feel that Ms. Alvarado will benefit from skilled occupational therapy to maximize the functional use of her Little finger PIP joint proximal aspect. and upper extremity in daily activities . PLAN OF CARE:   PROBLEM LIST:  1. Pain in left little finger. 2. Decreased motion in left little finger. 3. Decreased strength in left hand. INTERVENTIONS PLANNED:  1. Modalities that may include fluidotherapy, paraffin, ultrasound, and light therapy. 2. Therapeutic exercise including a home exercise program.  3. Manual therapy. 4. Therapeutic activities. TREATMENT PLAN:  Effective Dates: 3/6/2018 TO 2018. Frequency/Duration: For splint only today, will begin therapy after pins are removed.   GOALS: (Goals have been discussed and agreed upon with patient.)  Short-Term Functional Goals: Time Frame: 6 weeks  1. Decrease pain to 2 to allow patient to perform self care tasks. 2. Increase motion in left little finger by 20 degrees to improve functional use of upper extremity in ADL activities. 3. Increase strength in left hand by 10 pounds to allow patient to  and lift objects during self care activities. Discharge Goals: Time Frame: 12 weeks  1. Decrease pain to 1 to allow patient to perform all household  tasks. 2. Increase motion in left little finger by 40 degreees to allow patient to perform all ADL activities. 3. Increase strength in left hand by 15 pounds to allow patient to , lift, hold, and carry heavy objects. Rehabilitation Potential For Stated Goals: Good  Regarding [facility] therapy, I certify that the treatment plan above will be carried out by a therapist or under their direction. Thank you for this referral,  Manjit Vides, OT       Referring Physician Signature: Yessenia Valdez MD _________________________  Date _________            The information in this section was collected on 3/6/2018 (except where otherwise noted). OCCUPATIONAL PROFILE & HISTORY:   History of Present Injury/Illness (Reason for Referral):  Patient injured her left little finger when walking her dog. The leash wrapped around her hand when the dog ran. Past Medical History/Comorbidities:   Ms. Vivi Wilcox  has a past medical history of Diabetes ([address] Finger pain, left; Hypertension; and Kidney stone (last one 2012). She also has no past medical history of Adverse effect of anesthesia; Difficult intubation; Malignant hyperthermia due to anesthesia; Nausea & vomiting; or Pseudocholinesterase deficiency. Ms. Vivi Wilcox  has a past surgical history that includes hx breast biopsy; hx breast reduction; hx tonsillectomy; hx hysterectomy; hx lithotripsy (2012); and hx other surgical (1997).   Social History/Living Environment:      Prior Level of Function/Work/Activity:  independent  Dominant Side:         LEFT  Current Medications:    Current Outpatient Prescriptions:     lisinopril-hydroCHLOROthiazide (PRINZIDE, ZESTORETIC) 20-25 mg per tablet, Take 1 Tab by mouth two (2) times a day., Disp: , Rfl:     SITagliptin (JANUVIA) 100 mg tablet, Take 100 mg by mouth daily. , Disp: , Rfl:     traMADol (ULTRAM) 50 mg tablet, Take 1 Tab by mouth every eight (8) hours as needed for Pain. Max Daily Amount: 150 mg., Disp: 15 Tab, Rfl: 0    estradiol (VIVELLE) 0.05 mg/24 hr, 1 Patch by TransDERmal route two (2) times a week. Takes on tues, and sat, Disp: , Rfl:     citalopram (CELEXA) 10 mg tablet, Take 20 mg by mouth daily. Pt takes 1/2  Of 40 mg tab, Disp: , Rfl:     nicotinic acid (NIACIN) 100 mg tablet, Take 100 mg by mouth every evening., Disp: , Rfl:     zolpidem (AMBIEN) 10 mg tablet, Take  by mouth nightly as needed for Sleep., Disp: , Rfl:     cholecalciferol (VITAMIN D3) 1,000 unit cap, Take 2,000 Units by mouth daily. , Disp: , Rfl:     metoprolol-XL (TOPROL XL) 100 mg XL tablet, Take 50 mg by mouth nightly., Disp: , Rfl:     aspirin 81 mg tablet, Take 81 mg by mouth every other day., Disp: , Rfl:    Date Last Reviewed:  4/30/2018    Number of medical conditions (excluding presenting problem) that affect the Plan of Care: Brief history (0):  LOW COMPLEXITY   ASSESSMENT OF OCCUPATIONAL PERFORMANCE:   RANGE OF MOTION:     · AROM: Measurement not taken due to recent surgery. STRENGTH:  Not tested. SENSATION:  intact           Physical Skills Involved:  1. Range of Motion  2. Strength  3. Pain (acute) Cognitive Skills Affected (resulting in the inability to perform in a timely and safe manner): 1. none Psychosocial Skills Affected:  1. none   Number of elements that affect the Plan of Care: 1-3:  LOW COMPLEXITY   CLINICAL DECISION MAKING:   Outcome Measure:    Tool Used: Disabilities of the Arm, Shoulder and Hand (DASH) Questionnaire - Quick Version  Score:  Initial: 42/55  Most Recent: X/55 (Date: -- )   Interpretation of Score: The DASH is designed to measure the activities of daily living in person's with upper extremity dysfunction or pain. Each section is scored on a 1-5 scale, 5 representing the greatest disability. The scores of each section are added together for a total score of 55. Score 11 12-19 20-28 29-37 38-45 46-54 55   Modifier CH CI CJ CK CL CM CN     ? Carrying, Moving, and Handling Objects:     - CURRENT STATUS: CL - 60%-79% impaired, limited or restricted    - GOAL STATUS: CK - 40%-59% impaired, limited or restricted    - D/C STATUS:  ---------------To be determined---------------    Medical Necessity:   · Patient is expected to demonstrate progress in strength and range of motion to decrease assistance required with ADL,and household activities. .  Reason for Services/Other Comments:  · Patient has limited motion,strength and function in her left hand. .  Clinical Decision-Making Assessment:     Clinical Decision-Making: LOW COMPLEXITY   TREATMENT:   (In addition to Assessment/Re-Assessment sessions the following treatments were rendered)  Pre-treatment Symptoms/Complaints:  Pain and stiffness in left little finger.   Pain: Initial:   Pain Intensity 1: 0  Pain Location 1: Hand (small finger)  Pain Orientation 1: Left  Post Session:  0     Patient was provided with a handbased splint with little and ring fingers in a safe postion  Patient stated \"I can't move my finger much\"    Manual Therapy: (Soft Tissue Mobilization Duration  Duration: 15 Minutes  Duration: 15 Minutes): Technique: Retrograde massage (followed by gentle PROM)  Tissue Mobilized: Scar/adhesion  Finger Mobilized: 5th digit  LUE Soft Tissue Mobilization: Yes   Therapeutic Exercise:                                                                               : The patient was instructed in a home exercise program                                                Date:  4/30/18 Date: Date: Date: Date: Activity/Exercise Parameters Parameters Parameters Parameters Parameters   AROM during Fluidotherapy 20 min       Paraffin with Stretch 15 min  flexion       Retrograde massage, Friction Scar massage, Joint Mobilization   15 min       Scarf Curl   5 min       Washer Game        Individual Gripper          Hand [place]          Cones          Pegs          Clothes Pins          A-R Bar          Exerstick          Velcro-Roll          Blocking boards 5 min       RESISTIVE EXERCISES Weight/ Sets/Reps   Weight/ Sets/Reps Weight/ Sets/Reps Weight/ Sets/Reps Weight/ Sets/Reps   WEIGHT WELL        Sup/Pro        UD/RD        Wrist Flex/Ext        Free Weights          UBE(Minutes)          Nautilus        Compound Row        Vertical Chest        Overhead Press                    HEP: As above; handouts given to patient for all exercises. Therapeutic Modalities:         Left Wrist Heat  Type: Paraffin bath (with a finger flexion stretch)  Duration: 15 minutes  Patient Position: Sitting                                     Joint Mobilization:        Treatment Times:  · Therapeutic Exercise: 30 minutes  · Manual Therapy: 15 minutes  · Parafin: 15 minutes  · Whirlpool:  minutes  · Other:  minutes       Treatment/Session Assessment:    · Response to Treatment:  Patients tolerated treatment well with no complications. Upon completion of treatment, skin condition was normal..  · Compliance with Program/Exercises: Will assess as treatment progresses. · Recommendations/Intent for next treatment session: \"Will continue per MD..\".   Total Treatment Duration:  OT Patient Time In/Time Out  Time In: 0100  Time Out: [address] [facility]

## 2018-05-02 ENCOUNTER — HOSPITAL ENCOUNTER (OUTPATIENT)
Dept: PHYSICAL THERAPY | Age: 68
Discharge: HOME | End: 2018-05-02
Payer: MEDICARE

## 2018-05-02 PROCEDURE — 97018 PARAFFIN BATH THERAPY: CPT

## 2018-05-02 PROCEDURE — 97110 THERAPEUTIC EXERCISES: CPT

## 2018-05-02 PROCEDURE — 97140 MANUAL THERAPY 1/> REGIONS: CPT

## 2018-05-07 NOTE — PROGRESS NOTES
Leland Garcia  : 1950  [place]: [place]  Secondary:  [facility] [place] [address] [place] [address], [facility].  [phone]   [phone]          OUTPATIENT OCCUPATIONAL THERAPY: Daily Note 2018    ICD-10: Treatment Diagnosis: Stiffness of left hand, not elsewhere classified (M25.642)Pain in left hand (N09.962)  Precautions/Allergies:   Cephalexin; Cephalosporins; Morphine; and Pcn [penicillins]   Fall Risk Score: 5 (? 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 Marni Guy presents with decreased functional use, strength and range of motion of her left Little finger PIP joint proximal aspect. and upper extremity that is affecting her independence with activities of daily living and ability to perform job tasks. I feel that Ms. Alvarado will benefit from skilled occupational therapy to maximize the functional use of her Little finger PIP joint proximal aspect. and upper extremity in daily activities . PLAN OF CARE:   PROBLEM LIST:  1. Pain in left little finger. 2. Decreased motion in left little finger. 3. Decreased strength in left hand. INTERVENTIONS PLANNED:  1. Modalities that may include fluidotherapy, paraffin, ultrasound, and light therapy. 2. Therapeutic exercise including a home exercise program.  3. Manual therapy. 4. Therapeutic activities. TREATMENT PLAN:  Effective Dates: 3/6/2018 TO 2018. Frequency/Duration: For splint only today, will begin therapy after pins are removed.   GOALS: (Goals have been discussed and agreed upon with patient.)  Short-Term Functional Goals: Time Frame: 6 weeks  1. Decrease pain to 2 to allow patient to perform self care tasks. 2. Increase motion in left little finger by 20 degrees to improve functional use of upper extremity in ADL activities. 3. Increase strength in left hand by 10 pounds to allow patient to  and lift objects during self care activities. Discharge Goals: Time Frame: 12 weeks  1. Decrease pain to 1 to allow patient to perform all household  tasks. 2. Increase motion in left little finger by 40 degreees to allow patient to perform all ADL activities. 3. Increase strength in left hand by 15 pounds to allow patient to , lift, hold, and carry heavy objects. Rehabilitation Potential For Stated Goals: Good  Regarding [facility] therapy, I certify that the treatment plan above will be carried out by a therapist or under their direction. Thank you for this referral,  Vale Yao, OT       Referring Physician Signature: Elle Werner MD _________________________  Date _________            The information in this section was collected on 3/6/2018 (except where otherwise noted). OCCUPATIONAL PROFILE & HISTORY:   History of Present Injury/Illness (Reason for Referral):  Patient injured her left little finger when walking her dog. The leash wrapped around her hand when the dog ran. Past Medical History/Comorbidities:   Ms. Rashmi Allen  has a past medical history of Diabetes ([address] Finger pain, left; Hypertension; and Kidney stone (last one 2012). She also has no past medical history of Adverse effect of anesthesia; Difficult intubation; Malignant hyperthermia due to anesthesia; Nausea & vomiting; or Pseudocholinesterase deficiency. Ms. Rashmi Allen  has a past surgical history that includes hx breast biopsy; hx breast reduction; hx tonsillectomy; hx hysterectomy; hx lithotripsy (2012); and hx other surgical (1997).   Social History/Living Environment:      Prior Level of Function/Work/Activity:  independent  Dominant Side:         LEFT  Current Medications:    Current Outpatient Prescriptions:     lisinopril-hydroCHLOROthiazide (PRINZIDE, ZESTORETIC) 20-25 mg per tablet, Take 1 Tab by mouth two (2) times a day., Disp: , Rfl:     SITagliptin (JANUVIA) 100 mg tablet, Take 100 mg by mouth daily. , Disp: , Rfl:     traMADol (ULTRAM) 50 mg tablet, Take 1 Tab by mouth every eight (8) hours as needed for Pain. Max Daily Amount: 150 mg., Disp: 15 Tab, Rfl: 0    estradiol (VIVELLE) 0.05 mg/24 hr, 1 Patch by TransDERmal route two (2) times a week. Takes on tues, and sat, Disp: , Rfl:     citalopram (CELEXA) 10 mg tablet, Take 20 mg by mouth daily. Pt takes 1/2  Of 40 mg tab, Disp: , Rfl:     nicotinic acid (NIACIN) 100 mg tablet, Take 100 mg by mouth every evening., Disp: , Rfl:     zolpidem (AMBIEN) 10 mg tablet, Take  by mouth nightly as needed for Sleep., Disp: , Rfl:     cholecalciferol (VITAMIN D3) 1,000 unit cap, Take 2,000 Units by mouth daily. , Disp: , Rfl:     metoprolol-XL (TOPROL XL) 100 mg XL tablet, Take 50 mg by mouth nightly., Disp: , Rfl:     aspirin 81 mg tablet, Take 81 mg by mouth every other day., Disp: , Rfl:    Date Last Reviewed:  5/2/2018   Number of medical conditions (excluding presenting problem) that affect the Plan of Care: Brief history (0):  LOW COMPLEXITY   ASSESSMENT OF OCCUPATIONAL PERFORMANCE:   RANGE OF MOTION:     · AROM: Measurement not taken due to recent surgery. STRENGTH:  Not tested. SENSATION:  intact           Physical Skills Involved:  1. Range of Motion  2. Strength  3. Pain (acute) Cognitive Skills Affected (resulting in the inability to perform in a timely and safe manner): 1. none Psychosocial Skills Affected:  1. none   Number of elements that affect the Plan of Care: 1-3:  LOW COMPLEXITY   CLINICAL DECISION MAKING:   Outcome Measure:    Tool Used: Disabilities of the Arm, Shoulder and Hand (DASH) Questionnaire - Quick Version  Score:  Initial: 42/55  Most Recent: X/55 (Date: -- )   Interpretation of Score: The DASH is designed to measure the activities of daily living in person's with upper extremity dysfunction or pain. Each section is scored on a 1-5 scale, 5 representing the greatest disability. The scores of each section are added together for a total score of 55. Score 11 12-19 20-28 29-37 38-45 46-54 55   Modifier CH CI CJ CK CL CM CN     ? Carrying, Moving, and Handling Objects:     - CURRENT STATUS: CL - 60%-79% impaired, limited or restricted    - GOAL STATUS: CK - 40%-59% impaired, limited or restricted    - D/C STATUS:  ---------------To be determined---------------    Medical Necessity:   · Patient is expected to demonstrate progress in strength and range of motion to decrease assistance required with ADL,and household activities. .  Reason for Services/Other Comments:  · Patient has limited motion,strength and function in her left hand. .  Clinical Decision-Making Assessment:     Clinical Decision-Making: LOW COMPLEXITY   TREATMENT:   (In addition to Assessment/Re-Assessment sessions the following treatments were rendered)  Pre-treatment Symptoms/Complaints:  Pain and stiffness in left little finger.   Pain: Initial:   Pain Intensity 1: 2  Pain Location 1: Hand (small finger)  Pain Orientation 1: Left  Post Session:  0     Patient was provided with a handbased splint with little and ring fingers in a safe postion  Patient stated \"I slowly moving a little more\"    Manual Therapy: (Soft Tissue Mobilization Duration  Duration: 15 Minutes  Duration: 15 Minutes): Technique: Retrograde massage (followed by gentle PROM)  Tissue Mobilized: Scar/adhesion  Finger Mobilized: 5th digit  LUE Soft Tissue Mobilization: Yes   Therapeutic Exercise:                                                                               : The patient was instructed in a home exercise program                                                Date:  4/30/18 Date:  5/2/2018 Date: Date: Date: Activity/Exercise Parameters Parameters Parameters Parameters Parameters   AROM during Fluidotherapy 20 min 20 min      Paraffin with Stretch 15 min  flexion 15 min  flexion      Retrograde massage, Friction Scar massage, Joint Mobilization   15 min 15 min      Scarf Curl   5 min 5 min      Washer Game        Individual Gripper          Hand [place]          [place]          AROM EX    5 min      Clothes Pins          A-R Bar          Exerstick    40 reps      Velcro-Roll          Blocking boards 5 min 5 min      RESISTIVE EXERCISES Weight/ Sets/Reps   Weight/ Sets/Reps Weight/ Sets/Reps Weight/ Sets/Reps Weight/ Sets/Reps   WEIGHT WELL        Sup/Pro        UD/RD        Wrist Flex/Ext        Free Weights          UBE(Minutes)          Nautilus        Compound Row        Vertical Chest        Overhead Press                    HEP: As above; handouts given to patient for all exercises. Therapeutic Modalities:         Left Wrist Heat  Type: Paraffin bath (with a finger flexion stretch)  Duration: 15 minutes  Patient Position: Sitting                                     Joint Mobilization:        Treatment Times:  · Therapeutic Exercise: 30 minutes  · Manual Therapy: 15 minutes  · Parafin: 15 minutes  · Whirlpool:  minutes  · Other:  minutes       Treatment/Session Assessment:    · Response to Treatment:  Patients tolerated treatment well with no complications. Upon completion of treatment, skin condition was normal..  · Compliance with Program/Exercises: Will assess as treatment progresses. · Recommendations/Intent for next treatment session: \"Will continue per MD..\".   Total Treatment Duration:  OT Patient Time In/Time Out  Time In: 0200  Time Out: [address], OT

## 2018-05-17 ENCOUNTER — APPOINTMENT (OUTPATIENT)
Dept: PHYSICAL THERAPY | Age: 68
End: 2018-05-17
Payer: MEDICARE

## 2018-06-04 ENCOUNTER — HOSPITAL ENCOUNTER (OUTPATIENT)
Dept: PHYSICAL THERAPY | Age: 68
Discharge: HOME | End: 2018-06-04
Payer: MEDICARE

## 2018-06-04 PROCEDURE — 97018 PARAFFIN BATH THERAPY: CPT

## 2018-06-04 PROCEDURE — 97140 MANUAL THERAPY 1/> REGIONS: CPT

## 2018-06-04 PROCEDURE — 97110 THERAPEUTIC EXERCISES: CPT

## 2018-06-05 ENCOUNTER — APPOINTMENT (OUTPATIENT)
Dept: PHYSICAL THERAPY | Age: 68
End: 2018-06-05
Payer: MEDICARE

## 2018-06-05 NOTE — PROGRESS NOTES
Osvaldo Greenberg  : 1950  Primary: [place] [employer] [place]  Secondary:  [facility] [place] [address], [place] [address].  [phone]   [phone]          OUTPATIENT OCCUPATIONAL THERAPY: Daily Note 2018    ICD-10: Treatment Diagnosis: Stiffness of left hand, not elsewhere classified (M25.642)Pain in left hand (N03.226)  Precautions/Allergies:   Cephalexin; Cephalosporins; Morphine; and Pcn [penicillins]   Fall Risk Score: 5 (? 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 Rema Amador presents with decreased functional use, strength and range of motion of her left Little finger PIP joint proximal aspect. and upper extremity that is affecting her independence with activities of daily living and ability to perform job tasks. I feel that Ms. Alvarado will benefit from skilled occupational therapy to maximize the functional use of her Little finger PIP joint proximal aspect. and upper extremity in daily activities . PLAN OF CARE:   PROBLEM LIST:  1. Pain in left little finger. 2. Decreased motion in left little finger. 3. Decreased strength in left hand. INTERVENTIONS PLANNED:  1. Modalities that may include fluidotherapy, paraffin, ultrasound, and light therapy. 2. Therapeutic exercise including a home exercise program.  3. Manual therapy. 4. Therapeutic activities. TREATMENT PLAN:  Effective Dates: 3/6/2018 TO 2018. Frequency/Duration: For splint only today, will begin therapy after pins are removed.   GOALS: (Goals have been discussed and agreed upon with patient.)  Short-Term Functional Goals: Time Frame: 6 weeks  1. Decrease pain to 2 to allow patient to perform self care tasks. 2. Increase motion in left little finger by 20 degrees to improve functional use of upper extremity in ADL activities. 3. Increase strength in left hand by 10 pounds to allow patient to  and lift objects during self care activities. Discharge Goals: Time Frame: 12 weeks  1. Decrease pain to 1 to allow patient to perform all household  tasks. 2. Increase motion in left little finger by 40 degreees to allow patient to perform all ADL activities. 3. Increase strength in left hand by 15 pounds to allow patient to , lift, hold, and carry heavy objects. Rehabilitation Potential For Stated Goals: Good  Regarding [facility] therapy, I certify that the treatment plan above will be carried out by a therapist or under their direction. Thank you for this referral,  Marcela Ramirez OT       Referring Physician Signature: Yee Cisneros MD _________________________  Date _________            The information in this section was collected on 3/6/2018 (except where otherwise noted). OCCUPATIONAL PROFILE & HISTORY:   History of Present Injury/Illness (Reason for Referral):  Patient injured her left little finger when walking her dog. The leash wrapped around her hand when the dog ran. Past Medical History/Comorbidities:   Ms. Timbo Zheng  has a past medical history of Diabetes ([facility] [address] Finger pain, left; Hypertension; and Kidney stone (last one 2012). She also has no past medical history of Adverse effect of anesthesia; Difficult intubation; Malignant hyperthermia due to anesthesia; Nausea & vomiting; or Pseudocholinesterase deficiency. Ms. Timbo Zheng  has a past surgical history that includes hx breast biopsy; hx breast reduction; hx tonsillectomy; hx hysterectomy; hx lithotripsy (2012); and hx other surgical (1997).   Social History/Living Environment:      Prior Level of Function/Work/Activity:  independent  Dominant Side:         LEFT  Current Medications:    Current Outpatient Prescriptions:     lisinopril-hydroCHLOROthiazide (PRINZIDE, ZESTORETIC) 20-25 mg per tablet, Take 1 Tab by mouth two (2) times a day., Disp: , Rfl:     SITagliptin (JANUVIA) 100 mg tablet, Take 100 mg by mouth daily. , Disp: , Rfl:     traMADol (ULTRAM) 50 mg tablet, Take 1 Tab by mouth every eight (8) hours as needed for Pain. Max Daily Amount: 150 mg., Disp: 15 Tab, Rfl: 0    estradiol (VIVELLE) 0.05 mg/24 hr, 1 Patch by TransDERmal route two (2) times a week. Takes on tues, and sat, Disp: , Rfl:     citalopram (CELEXA) 10 mg tablet, Take 20 mg by mouth daily. Pt takes 1/2  Of 40 mg tab, Disp: , Rfl:     nicotinic acid (NIACIN) 100 mg tablet, Take 100 mg by mouth every evening., Disp: , Rfl:     zolpidem (AMBIEN) 10 mg tablet, Take  by mouth nightly as needed for Sleep., Disp: , Rfl:     cholecalciferol (VITAMIN D3) 1,000 unit cap, Take 2,000 Units by mouth daily. , Disp: , Rfl:     metoprolol-XL (TOPROL XL) 100 mg XL tablet, Take 50 mg by mouth nightly., Disp: , Rfl:     aspirin 81 mg tablet, Take 81 mg by mouth every other day., Disp: , Rfl:    Date Last Reviewed:  6/4/2018   Number of medical conditions (excluding presenting problem) that affect the Plan of Care: Brief history (0):  LOW COMPLEXITY   ASSESSMENT OF OCCUPATIONAL PERFORMANCE:   RANGE OF MOTION:     · AROM: Measurement not taken due to recent surgery. STRENGTH:  Not tested. SENSATION:  intact           Physical Skills Involved:  1. Range of Motion  2. Strength  3. Pain (acute) Cognitive Skills Affected (resulting in the inability to perform in a timely and safe manner): 1. none Psychosocial Skills Affected:  1. none   Number of elements that affect the Plan of Care: 1-3:  LOW COMPLEXITY   CLINICAL DECISION MAKING:   Outcome Measure:    Tool Used: Disabilities of the Arm, Shoulder and Hand (DASH) Questionnaire - Quick Version  Score:  Initial: 42/55  Most Recent: X/55 (Date: -- )   Interpretation of Score: The DASH is designed to measure the activities of daily living in person's with upper extremity dysfunction or pain. Each section is scored on a 1-5 scale, 5 representing the greatest disability. The scores of each section are added together for a total score of 55. Score 11 12-19 20-28 29-37 38-45 46-54 55   Modifier CH CI CJ CK CL CM CN     ? Carrying, Moving, and Handling Objects:     - CURRENT STATUS: CL - 60%-79% impaired, limited or restricted    - GOAL STATUS: CK - 40%-59% impaired, limited or restricted    - D/C STATUS:  ---------------To be determined---------------    Medical Necessity:   · Patient is expected to demonstrate progress in strength and range of motion to decrease assistance required with ADL,and household activities. .  Reason for Services/Other Comments:  · Patient has limited motion,strength and function in her left hand. .  Clinical Decision-Making Assessment:     Clinical Decision-Making: LOW COMPLEXITY   TREATMENT:   (In addition to Assessment/Re-Assessment sessions the following treatments were rendered)  Pre-treatment Symptoms/Complaints:  Pain and stiffness in left little finger. Pain: Initial:   Pain Intensity 1: 0  Pain Location 1: Hand  Pain Orientation 1: Left  Post Session:  0     Patient was provided with a handbased splint with little and ring fingers in a safe postion  Patient stated \"I was able to play golf yesterday. \"    Manual Therapy: (Soft Tissue Mobilization Duration  Duration: 15 Minutes  Duration: 15 Minutes): Technique: Retrograde massage (followed by PROM)  Tissue Mobilized: Scar/adhesion  Finger Mobilized: 5th digit  LUE Soft Tissue Mobilization: Yes   Therapeutic Exercise:                                                                               : The patient was instructed in a home exercise program                                                Date:  4/30/18 Date:  5/2/2018 Date:  6/4/18 Date: Date: Activity/Exercise Parameters Parameters Parameters Parameters Parameters   AROM during Fluidotherapy 20 min 20 min 20 min     Paraffin with Stretch 15 min  flexion 15 min  flexion 15 min  flexion     Retrograde massage, Friction Scar massage, Joint Mobilization   15 min 15 min 15 min     Scarf Curl   5 min 5 min 5 min     Washer Game        Individual Gripper     20 reps     Hand [place]     20 reps     Cones          AROM EX    5 min 3 min     Clothes Pins     20 reps     A-R Bar          Exerstick    40 reps 40 reps     Velcro-Roll          Blocking boards 5 min 5 min 3 min     RESISTIVE EXERCISES Weight/ Sets/Reps   Weight/ Sets/Reps Weight/ Sets/Reps Weight/ Sets/Reps Weight/ Sets/Reps   WEIGHT WELL        Sup/Pro        UD/RD        Wrist Flex/Ext        Free Weights          UBE(Minutes)          Nautilus        Compound Row        Vertical Chest        Overhead Press                    HEP: As above; handouts given to patient for all exercises. Therapeutic Modalities:         Left Wrist Heat  Type: Paraffin bath (with a finger flexion stretch)  Duration: 15 minutes  Patient Position: Sitting                                     Joint Mobilization:        Treatment Times:  · Therapeutic Exercise: 30 minutes  · Manual Therapy: 15 minutes  · Parafin: 15 minutes  · Whirlpool:  minutes  · Other:  minutes       Treatment/Session Assessment:    · Response to Treatment:  Patients tolerated treatment well with no complications. Upon completion of treatment, skin condition was normal..  · Compliance with Program/Exercises: Will assess as treatment progresses. · Recommendations/Intent for next treatment session: \"Will continue per MD..\".   Total Treatment Duration:  OT Patient Time In/Time Out  Time In: 0200  Time Out: [address], OT

## 2018-06-11 ENCOUNTER — HOSPITAL ENCOUNTER (OUTPATIENT)
Dept: PHYSICAL THERAPY | Age: 68
Discharge: HOME | End: 2018-06-11
Payer: MEDICARE

## 2018-06-11 PROCEDURE — G8985 CARRY GOAL STATUS: HCPCS

## 2018-06-11 PROCEDURE — 97140 MANUAL THERAPY 1/> REGIONS: CPT

## 2018-06-11 PROCEDURE — G8986 CARRY D/C STATUS: HCPCS

## 2018-06-11 PROCEDURE — 97110 THERAPEUTIC EXERCISES: CPT

## 2018-06-11 PROCEDURE — 97018 PARAFFIN BATH THERAPY: CPT

## 2018-06-11 NOTE — THERAPY DISCHARGE
Sheryle Service  : 1950  [place]  Secondary:  [facility] [place] [address] [facility], [address] [place] [address],[address], [address] [facility]  [phone]   [phone]          OUTPATIENT OCCUPATIONAL THERAPY: Daily Note and Discharge 2018    ICD-10: Treatment Diagnosis: Stiffness of left hand, not elsewhere classified (M25.642)Pain in left hand (H15.225)  Precautions/Allergies:   Cephalexin; Cephalosporins; Morphine; and Pcn [penicillins]   Fall Risk Score: 5 (? 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 Yanna Moon presents with decreased functional use, strength and range of motion of her left Little finger PIP joint proximal aspect. and upper extremity that is affecting her independence with activities of daily living and ability to perform job tasks. I feel that Ms. Alvarado will benefit from skilled occupational therapy to maximize the functional use of her Little finger PIP joint proximal aspect. and upper extremity in daily activities . PLAN OF CARE:   PROBLEM LIST:  1. Pain in left little finger. 2. Decreased motion in left little finger. 3. Decreased strength in left hand. INTERVENTIONS PLANNED:  1. Modalities that may include fluidotherapy, paraffin, ultrasound, and light therapy. 2. Therapeutic exercise including a home exercise program.  3. Manual therapy. 4. Therapeutic activities. TREATMENT PLAN:  Effective Dates: 3/6/2018 TO 2018. Frequency/Duration: For splint only today, will begin therapy after pins are removed.   GOALS: (Goals have been discussed and agreed upon with patient.)  Short-Term Functional Goals: Time Frame: 6 weeks  1. Decrease pain to 2 to allow patient to perform self care tasks. ( GOAL MET )  2. Increase motion in left little finger by 20 degrees to improve functional use of upper extremity in ADL activities. ( GOAL MET )  3. Increase strength in left hand by 10 pounds to allow patient to  and lift objects during self care activities. ( GOAL MET )  Discharge Goals: Time Frame: 12 weeks  1. Decrease pain to 1 to allow patient to perform all household  Tasks. ( GOAL MET )  2. Increase motion in left little finger by 40 degreees to allow patient to perform all ADL activities. ( GOAL MET )  3. Increase strength in left hand by 15 pounds to allow patient to , lift, hold, and carry heavy objects. ( GOAL MET )  Rehabilitation Potential For Stated Goals: Good  Regarding Maria E Alvarado's therapy, I certify that the treatment plan above will be carried out by a therapist or under their direction. Thank you for this referral,  Barrera Soto OT       Referring Physician Signature: Mele Paez MD _________________________  Date _________            The information in this section was collected on 3/6/2018 (except where otherwise noted). OCCUPATIONAL PROFILE & HISTORY:   History of Present Injury/Illness (Reason for Referral):  Patient injured her left little finger when walking her dog. The leash wrapped around her hand when the dog ran. Past Medical History/Comorbidities:   Ms. Newton Tijerina  has a past medical history of Diabetes ([facility] [address] Finger pain, left; Hypertension; and Kidney stone (last one 2012). She also has no past medical history of Adverse effect of anesthesia; Difficult intubation; Malignant hyperthermia due to anesthesia; Nausea & vomiting; or Pseudocholinesterase deficiency. Ms. Newton Tijerina  has a past surgical history that includes hx breast biopsy; hx breast reduction; hx tonsillectomy; hx hysterectomy; hx lithotripsy (2012); and hx other surgical (1997).   Social History/Living Environment:      Prior Level of Function/Work/Activity:  independent  Dominant Side:         LEFT  Current Medications:    Current Outpatient Prescriptions:     lisinopril-hydroCHLOROthiazide (PRINZIDE, ZESTORETIC) 20-25 mg per tablet, Take 1 Tab by mouth two (2) times a day., Disp: , Rfl:     SITagliptin (JANUVIA) 100 mg tablet, Take 100 mg by mouth daily. , Disp: , Rfl:     traMADol (ULTRAM) 50 mg tablet, Take 1 Tab by mouth every eight (8) hours as needed for Pain. Max Daily Amount: 150 mg., Disp: 15 Tab, Rfl: 0    estradiol (VIVELLE) 0.05 mg/24 hr, 1 Patch by TransDERmal route two (2) times a week. Takes on tues, and sat, Disp: , Rfl:     citalopram (CELEXA) 10 mg tablet, Take 20 mg by mouth daily. Pt takes 1/2  Of 40 mg tab, Disp: , Rfl:     nicotinic acid (NIACIN) 100 mg tablet, Take 100 mg by mouth every evening., Disp: , Rfl:     zolpidem (AMBIEN) 10 mg tablet, Take  by mouth nightly as needed for Sleep., Disp: , Rfl:     cholecalciferol (VITAMIN D3) 1,000 unit cap, Take 2,000 Units by mouth daily. , Disp: , Rfl:     metoprolol-XL (TOPROL XL) 100 mg XL tablet, Take 50 mg by mouth nightly., Disp: , Rfl:     aspirin 81 mg tablet, Take 81 mg by mouth every other day., Disp: , Rfl:    Date Last Reviewed:  6/11/2018   Number of medical conditions (excluding presenting problem) that affect the Plan of Care: Brief history (0):  LOW COMPLEXITY   ASSESSMENT OF OCCUPATIONAL PERFORMANCE:   RANGE OF MOTION:     · AROM: Left little finger motion is as follows: MP 0/90, PIP 0/80, DIP 0/55 degrees. STRENGTH:  STRENGTH: Right 60 lbs. Left 58 lbs. LAT PINCH: Right 17 lbs. Left 18 lbs. 3 JAW JULIA: Right 13 lbs. Left 17 lbs. SENSATION:  intact           Physical Skills Involved:  1. Range of Motion  2. Strength  3. Pain (acute) Cognitive Skills Affected (resulting in the inability to perform in a timely and safe manner):   1. none Psychosocial Skills Affected:  1. none   Number of elements that affect the Plan of Care: 1-3:  LOW COMPLEXITY   CLINICAL DECISION MAKING:   Outcome Measure: Tool Used: Disabilities of the Arm, Shoulder and Hand (DASH) Questionnaire - Quick Version  Score:  Initial: 42/55  Most Recent: 11/55 (Date: 6/11/2018 )   Interpretation of Score: The DASH is designed to measure the activities of daily living in person's with upper extremity dysfunction or pain. Each section is scored on a 1-5 scale, 5 representing the greatest disability. The scores of each section are added together for a total score of 55. Score 11 12-19 20-28 29-37 38-45 46-54 55   Modifier CH CI CJ CK CL CM CN     ? Carrying, Moving, and Handling Objects:     - CURRENT STATUS: CH - 0% impaired, limited or restricted    - GOAL STATUS: CK - 40%-59% impaired, limited or restricted    - D/C STATUS:  CH - 0% impaired, limited or restricted    Medical Necessity:   · Patient is expected to demonstrate progress in strength and range of motion to decrease assistance required with ADL,and household activities. .  Reason for Services/Other Comments:  · Patient has limited motion,strength and function in her left hand. .  Clinical Decision-Making Assessment:     Clinical Decision-Making: LOW COMPLEXITY   TREATMENT:   (In addition to Assessment/Re-Assessment sessions the following treatments were rendered)  Pre-treatment Symptoms/Complaints:  Pain and stiffness in left little finger. Pain: Initial:   Pain Intensity 1: 0  Pain Location 1: Hand (little finger)  Pain Orientation 1: Left  Post Session:  0     Patient was provided with a handbased splint with little and ring fingers in a safe postion  Patient stated \"I am doing great. Ciarra Rogers \"    Manual Therapy: (Soft Tissue Mobilization Duration  Duration: 15 Minutes  Duration: 15 Minutes): Technique: Retrograde massage (folowed by PROM)  Tissue Mobilized: Scar/adhesion  LUE Soft Tissue Mobilization: Yes   Therapeutic Exercise: : The patient was instructed in a home exercise program                                                Date:  4/30/18 Date:  5/2/2018 Date:  6/4/18 Date:  6/11/18 Date:   Activity/Exercise Parameters Parameters Parameters Parameters Parameters   AROM during Fluidotherapy 20 min 20 min 20 min 20 min    Paraffin with Stretch 15 min  flexion 15 min  flexion 15 min  flexion 15 min  flexion    Retrograde massage, Friction Scar massage, Joint Mobilization   15 min 15 min 15 min 15 min    Scarf Curl   5 min 5 min 5 min 2 min    Washer Game        Individual Gripper     20 reps 20 reps    Hand [place]     20 reps 20 reps    Cones          AROM EX    5 min 3 min 2 min    Clothes Pins     20 reps 20 reps    A-R Bar          Exerstick    40 reps 40 reps 40 reps    Putty      3 min    Blocking boards 5 min 5 min 3 min 2 min    RESISTIVE EXERCISES Weight/ Sets/Reps   Weight/ Sets/Reps Weight/ Sets/Reps Weight/ Sets/Reps Weight/ Sets/Reps   WEIGHT WELL        Sup/Pro        UD/RD        Wrist Flex/Ext        Free Weights          UBE(Minutes)          Nautilus        Compound Row        Vertical Chest        Overhead Press                    HEP: As above; handouts given to patient for all exercises. Therapeutic Modalities:         Left Wrist Heat  Type: Paraffin bath (with a finger flexion stretch)  Duration: 15 minutes  Patient Position: Sitting                                     Joint Mobilization:        Treatment Times:  · Therapeutic Exercise: 30 minutes  · Manual Therapy: 15 minutes  · Parafin: 15 minutes  · Whirlpool:  minutes  · Other:  minutes       Treatment/Session Assessment:    · Response to Treatment:  Patients tolerated treatment well with no complications. Upon completion of treatment, skin condition was normal..  · Compliance with Program/Exercises: Will assess as treatment progresses. · Recommendations/Intent for next treatment session: \"To discharge. All goals met. \".   Total Treatment Duration:  OT Patient Time In/Time Out  Time In: 0100  Time Out: [address], OT

## 2018-08-30 ENCOUNTER — HOSPITAL ENCOUNTER (OUTPATIENT)
Dept: MAMMOGRAPHY | Age: 68
Discharge: HOME | End: 2018-08-30
Attending: INTERNAL MEDICINE
Payer: MEDICARE

## 2018-08-30 DIAGNOSIS — Z12.31 VISIT FOR SCREENING MAMMOGRAM: ICD-10-CM

## 2018-08-30 PROCEDURE — 77063 BREAST TOMOSYNTHESIS BI: CPT

## 2019-09-06 ENCOUNTER — HOSPITAL ENCOUNTER (OUTPATIENT)
Dept: GENERAL RADIOLOGY | Age: 69
Discharge: HOME | End: 2019-09-06
Attending: INTERNAL MEDICINE
Payer: MEDICARE

## 2019-09-06 DIAGNOSIS — I10 ESSENTIAL HYPERTENSION, MALIGNANT: ICD-10-CM

## 2019-09-06 PROCEDURE — 71046 X-RAY EXAM CHEST 2 VIEWS: CPT

## 2019-11-02 ENCOUNTER — HOSPITAL ENCOUNTER (OUTPATIENT)
Dept: MAMMOGRAPHY | Age: 69
Discharge: HOME | End: 2019-11-02
Attending: INTERNAL MEDICINE
Payer: MEDICARE

## 2019-11-02 DIAGNOSIS — Z12.31 VISIT FOR SCREENING MAMMOGRAM: ICD-10-CM

## 2019-11-02 PROCEDURE — 77063 BREAST TOMOSYNTHESIS BI: CPT

## 2020-08-27 ENCOUNTER — HOSPITAL ENCOUNTER (OUTPATIENT)
Dept: GENERAL RADIOLOGY | Age: 70
Discharge: HOME | End: 2020-08-27
Attending: INTERNAL MEDICINE
Payer: MEDICARE

## 2020-08-27 DIAGNOSIS — I10 HIGH BLOOD PRESSURE: ICD-10-CM

## 2020-08-27 PROCEDURE — 71046 X-RAY EXAM CHEST 2 VIEWS: CPT

## 2020-10-06 ENCOUNTER — TRANSCRIBE ORDER (OUTPATIENT)
Dept: SCHEDULING | Age: 70
End: 2020-10-06

## 2020-10-06 DIAGNOSIS — Z12.31 VISIT FOR SCREENING MAMMOGRAM: Primary | ICD-10-CM

## 2020-10-07 ENCOUNTER — TRANSCRIBE ORDER (OUTPATIENT)
Dept: SCHEDULING | Age: 70
End: 2020-10-07

## 2020-10-07 DIAGNOSIS — M81.0 OSTEOPOROSIS: Primary | ICD-10-CM

## 2020-11-03 ENCOUNTER — HOSPITAL ENCOUNTER (OUTPATIENT)
Dept: MAMMOGRAPHY | Age: 70
Discharge: HOME | End: 2020-11-03
Attending: INTERNAL MEDICINE
Payer: MEDICARE

## 2020-11-03 DIAGNOSIS — Z12.31 VISIT FOR SCREENING MAMMOGRAM: ICD-10-CM

## 2020-11-03 PROCEDURE — 77063 BREAST TOMOSYNTHESIS BI: CPT

## 2020-11-10 ENCOUNTER — HOSPITAL ENCOUNTER (OUTPATIENT)
Dept: MAMMOGRAPHY | Age: 70
Discharge: HOME | End: 2020-11-10
Attending: INTERNAL MEDICINE
Payer: MEDICARE

## 2020-11-10 DIAGNOSIS — M81.0 OSTEOPOROSIS: ICD-10-CM

## 2020-11-10 PROCEDURE — 77080 DXA BONE DENSITY AXIAL: CPT

## 2024-09-23 ENCOUNTER — CLINICAL DOCUMENTATION (OUTPATIENT)
Dept: ORTHOPEDIC SURGERY | Age: 74
End: 2024-09-23

## 2024-12-09 ENCOUNTER — TELEPHONE (OUTPATIENT)
Dept: INTERNAL MEDICINE CLINIC | Facility: CLINIC | Age: 74
End: 2024-12-09

## 2024-12-09 NOTE — TELEPHONE ENCOUNTER
Let pt know [facility] is not accepting new patients, pt not interested and scheduling with others said she would find someone else

## 2024-12-09 NOTE — TELEPHONE ENCOUNTER
----- Message from Modesta PENN sent at 12/9/2024 10:00 AM EST -----  Regarding: [facility] Appointment Request  [facility] Appointment Request    Patient needs appointment for [facility] Appointment Type: New Patient.    Patient Requested Dates(s): anyday but not Thursday   Patient Requested Time: In the morning   Provider Name:Ubaldo Diaz MD    Reason for Appointment Request: New Patient - No appointments available during search  --------------------------------------------------------------------------------------------------------------------------    Relationship to Patient: Self     Call Back Information: OK to leave message on voicemail  Preferred Call Back Number: Phone [phone] (home)

## 2024-12-10 ENCOUNTER — TELEPHONE (OUTPATIENT)
Dept: INTERNAL MEDICINE CLINIC | Facility: CLINIC | Age: 74
End: 2024-12-10

## 2024-12-10 NOTE — TELEPHONE ENCOUNTER
----- Message from Modesta PENN sent at 12/10/2024 10:29 AM EST -----  Regarding: [facility] Appointment Request  [facility] Appointment Request    Patient needs appointment for [facility] Appointment Type: New Patient.    Patient Requested Dates(s):Anyday   Patient Requested Time:Anytime   Provider Name:Ubaldo Diaz MD    Reason for Appointment Request: New Patient - Requested Provider unavailable  --------------------------------------------------------------------------------------------------------------------------    Relationship to Patient: Self     Call Back Information: OK to leave message on voicemail  Preferred Call Back Number: Phone [phone] (home)     Patient want to be establish

## 2024-12-10 NOTE — TELEPHONE ENCOUNTER
Please contact pt to schedule a new pt appt with either Dr. Ortega or Dr. Coronel since Dr. Diaz is not accepting new pt's. Thank you.

## 2024-12-17 NOTE — TELEPHONE ENCOUNTER
Pt has already been spoken to, she is not interested in scheduling with anyone but [facility], said she will find someone else

## (undated) DEVICE — FINGER STRIPS: Brand: DEROYAL

## (undated) DEVICE — SUTURE MCRYL SZ 4-0 L18IN ABSRB UD P-3 L13MM 3/8 CIR PRIM Y494G

## (undated) DEVICE — SPLINT THMB W3XL12IN FBRGLS PD PRECUT LTWT DURABLE FAST SET

## (undated) DEVICE — DRAPE XR C ARM 41X74IN LF --

## (undated) DEVICE — Device

## (undated) DEVICE — SUTURE MCRYL SZ 3-0 L27IN ABSRB UD L19MM PS-2 3/8 CIR PRIM Y427H

## (undated) DEVICE — ZIMMER® STERILE DISPOSABLE TOURNIQUET CUFF WITH PLC, DUAL PORT, SINGLE BLADDER, 18 IN. (46 CM)

## (undated) DEVICE — SOLUTION IV 1000ML 0.9% SOD CHL

## (undated) DEVICE — PADDING CAST W2INXL4YD ST COT COHESIVE HND TEARABLE SPEC

## (undated) DEVICE — CAP PROTCT PIN BALL 0.045IN --

## (undated) DEVICE — SUTURE MCRYL SZ 5-0 L18IN ABSRB UD L13MM P-3 3/8 CIR PRIM Y493G

## (undated) DEVICE — OCCLUSIVE GAUZE STRIP,3% BISMUTH TRIBROMOPHENATE IN PETROLATUM BLEND: Brand: XEROFORM

## (undated) DEVICE — HAND PK

## (undated) DEVICE — SUT PROL 4-0 18IN P3 BLU --

## (undated) DEVICE — STRETCH BANDAGE ROLL: Brand: DERMACEA

## (undated) DEVICE — (D)PREP SKN CHLRAPRP APPL 26ML -- CONVERT TO ITEM 371833

## (undated) DEVICE — STERILE HOOK LOCK LATEX FREE ELASTIC BANDAGE 3INX5YD: Brand: HOOK LOCK™

## (undated) DEVICE — INTENDED FOR TISSUE SEPARATION, AND OTHER PROCEDURES THAT REQUIRE A SHARP SURGICAL BLADE TO PUNCTURE OR CUT.: Brand: BARD-PARKER ® STAINLESS STEEL BLADES

## (undated) DEVICE — AMD ANTIMICROBIAL GAUZE SPONGES,12 PLY USP TYPE VII, 0.2% POLYHEXAMETHYLENE BIGUANIDE HCI (PHMB): Brand: CURITY

## (undated) DEVICE — HOOK LOCK LATEX FREE ELASTIC BANDAGE 3INX5YD